# Patient Record
Sex: MALE | Race: WHITE | NOT HISPANIC OR LATINO | Employment: OTHER | URBAN - METROPOLITAN AREA
[De-identification: names, ages, dates, MRNs, and addresses within clinical notes are randomized per-mention and may not be internally consistent; named-entity substitution may affect disease eponyms.]

---

## 2021-02-26 ENCOUNTER — APPOINTMENT (EMERGENCY)
Dept: RADIOLOGY | Facility: HOSPITAL | Age: 66
End: 2021-02-26
Payer: MEDICARE

## 2021-02-26 ENCOUNTER — HOSPITAL ENCOUNTER (EMERGENCY)
Facility: HOSPITAL | Age: 66
Discharge: HOME | End: 2021-02-26
Attending: EMERGENCY MEDICINE | Admitting: EMERGENCY MEDICINE
Payer: MEDICARE

## 2021-02-26 ENCOUNTER — APPOINTMENT (EMERGENCY)
Dept: CT IMAGING | Facility: HOSPITAL | Age: 66
End: 2021-02-26
Payer: MEDICARE

## 2021-02-26 VITALS
DIASTOLIC BLOOD PRESSURE: 98 MMHG | WEIGHT: 205 LBS | OXYGEN SATURATION: 98 % | TEMPERATURE: 97.7 F | RESPIRATION RATE: 20 BRPM | SYSTOLIC BLOOD PRESSURE: 192 MMHG | HEART RATE: 54 BPM

## 2021-02-26 DIAGNOSIS — R07.9 CHEST PAIN: Primary | ICD-10-CM

## 2021-02-26 LAB
ALBUMIN SERPL BCP-MCNC: 4.4 G/DL (ref 3.5–5)
ALP SERPL-CCNC: 64 U/L (ref 46–116)
ALT SERPL W P-5'-P-CCNC: 36 U/L (ref 12–78)
ANION GAP SERPL CALCULATED.3IONS-SCNC: 10 MMOL/L (ref 4–13)
AST SERPL W P-5'-P-CCNC: 20 U/L (ref 5–45)
BASOPHILS # BLD AUTO: 0.05 THOUSANDS/ΜL (ref 0–0.1)
BASOPHILS NFR BLD AUTO: 0 % (ref 0–1)
BILIRUB DIRECT SERPL-MCNC: 0.23 MG/DL (ref 0–0.2)
BILIRUB SERPL-MCNC: 0.7 MG/DL (ref 0.2–1)
BUN SERPL-MCNC: 10 MG/DL (ref 5–25)
CALCIUM SERPL-MCNC: 9.6 MG/DL (ref 8.3–10.1)
CHLORIDE SERPL-SCNC: 103 MMOL/L (ref 100–108)
CO2 SERPL-SCNC: 25 MMOL/L (ref 21–32)
CREAT SERPL-MCNC: 0.86 MG/DL (ref 0.6–1.3)
EOSINOPHIL # BLD AUTO: 0.12 THOUSAND/ΜL (ref 0–0.61)
EOSINOPHIL NFR BLD AUTO: 1 % (ref 0–6)
ERYTHROCYTE [DISTWIDTH] IN BLOOD BY AUTOMATED COUNT: 12.1 % (ref 11.6–15.1)
FLUAV RNA RESP QL NAA+PROBE: NEGATIVE
FLUBV RNA RESP QL NAA+PROBE: NEGATIVE
GFR SERPL CREATININE-BSD FRML MDRD: 92 ML/MIN/1.73SQ M
GLUCOSE SERPL-MCNC: 100 MG/DL (ref 65–140)
HCT VFR BLD AUTO: 47.1 % (ref 36.5–49.3)
HGB BLD-MCNC: 16.2 G/DL (ref 12–17)
IMM GRANULOCYTES # BLD AUTO: 0.05 THOUSAND/UL (ref 0–0.2)
IMM GRANULOCYTES NFR BLD AUTO: 0 % (ref 0–2)
LYMPHOCYTES # BLD AUTO: 1.7 THOUSANDS/ΜL (ref 0.6–4.47)
LYMPHOCYTES NFR BLD AUTO: 12 % (ref 14–44)
MCH RBC QN AUTO: 32.7 PG (ref 26.8–34.3)
MCHC RBC AUTO-ENTMCNC: 34.4 G/DL (ref 31.4–37.4)
MCV RBC AUTO: 95 FL (ref 82–98)
MONOCYTES # BLD AUTO: 0.81 THOUSAND/ΜL (ref 0.17–1.22)
MONOCYTES NFR BLD AUTO: 6 % (ref 4–12)
NEUTROPHILS # BLD AUTO: 11.17 THOUSANDS/ΜL (ref 1.85–7.62)
NEUTS SEG NFR BLD AUTO: 81 % (ref 43–75)
NRBC BLD AUTO-RTO: 0 /100 WBCS
PLATELET # BLD AUTO: 190 THOUSANDS/UL (ref 149–390)
PMV BLD AUTO: 10 FL (ref 8.9–12.7)
POTASSIUM SERPL-SCNC: 4.2 MMOL/L (ref 3.5–5.3)
PROT SERPL-MCNC: 7.9 G/DL (ref 6.4–8.2)
RBC # BLD AUTO: 4.96 MILLION/UL (ref 3.88–5.62)
RSV RNA RESP QL NAA+PROBE: NEGATIVE
SARS-COV-2 RNA RESP QL NAA+PROBE: NEGATIVE
SODIUM SERPL-SCNC: 138 MMOL/L (ref 136–145)
TROPONIN I SERPL-MCNC: <0.02 NG/ML
WBC # BLD AUTO: 13.9 THOUSAND/UL (ref 4.31–10.16)

## 2021-02-26 PROCEDURE — 99285 EMERGENCY DEPT VISIT HI MDM: CPT | Performed by: EMERGENCY MEDICINE

## 2021-02-26 PROCEDURE — 93005 ELECTROCARDIOGRAM TRACING: CPT

## 2021-02-26 PROCEDURE — 74174 CTA ABD&PLVS W/CONTRAST: CPT

## 2021-02-26 PROCEDURE — G1004 CDSM NDSC: HCPCS

## 2021-02-26 PROCEDURE — 0241U HB NFCT DS VIR RESP RNA 4 TRGT: CPT | Performed by: EMERGENCY MEDICINE

## 2021-02-26 PROCEDURE — 36415 COLL VENOUS BLD VENIPUNCTURE: CPT | Performed by: EMERGENCY MEDICINE

## 2021-02-26 PROCEDURE — 99285 EMERGENCY DEPT VISIT HI MDM: CPT

## 2021-02-26 PROCEDURE — 80076 HEPATIC FUNCTION PANEL: CPT | Performed by: EMERGENCY MEDICINE

## 2021-02-26 PROCEDURE — 80048 BASIC METABOLIC PNL TOTAL CA: CPT | Performed by: EMERGENCY MEDICINE

## 2021-02-26 PROCEDURE — 70450 CT HEAD/BRAIN W/O DYE: CPT

## 2021-02-26 PROCEDURE — 71275 CT ANGIOGRAPHY CHEST: CPT

## 2021-02-26 PROCEDURE — 84484 ASSAY OF TROPONIN QUANT: CPT | Performed by: EMERGENCY MEDICINE

## 2021-02-26 PROCEDURE — 71045 X-RAY EXAM CHEST 1 VIEW: CPT

## 2021-02-26 PROCEDURE — 85025 COMPLETE CBC W/AUTO DIFF WBC: CPT | Performed by: EMERGENCY MEDICINE

## 2021-02-26 RX ADMIN — IOHEXOL 100 ML: 350 INJECTION, SOLUTION INTRAVENOUS at 18:34

## 2021-02-26 NOTE — ED PROVIDER NOTES
History  Chief Complaint   Patient presents with    Chest Pain     Pt reports having chest tightness when he was letting his dog out  Felt SOB and then left leg tingling  Pt reports he currently only has tightness in one spot on his vhest       HPI patient is a 14-year-old male, presents with multiple complaints, patient was outside with his dog today and developed left chest tightness  Patient describes this as an uncomfortable soreness, he reports left anterior soreness when he presses on his chest   Patient reports he feels somewhat short of breath  Patient recently has had difficulty with his right knee and now has had a partial right knee replacement  Reports since the knee replacement he has been able to be more active but with activity such as shoveling snow or walking the dog he noticed some increasing shortness of breath  Patient does not developed chest pain during exertion but reports that he becomes winded more easily  Today the episode while he was working with his dog he developed some of this left anterior chest pain, reports also felt like his left leg was somewhat numb or tingling after he sat down in a chair  He denies any loss of consciousness  There are no rapid beating or palpitations  He denies any current chest pain  Denies any current shortness of breath  Patient has a history of bradycardia apparently followed by a cardiologist at 1 point discussed he may require a pacemaker at some time period he reports that 10 years ago he had a thorough evaluation with stress testing and echocardiogram which were normal   Patient presents emergency department today because of his left chest pain, sense of shortness of breath and this tingling feeling in his left leg  He denies any focal weakness in his arms or his legs  He can walk normally  I observed the patient walked to the room    Patient had recent surgery on his upper maxillary teeth, apparently had to have bone graft was on steroids and recently finished antibiotics he denies any fever or chills  Past medical history of bradycardia, carpal tunnel surgery, partial knee replacement surgery   Family history noncontributory   Social history nonsmoker, no history of drug abuse    None       Past Medical History:   Diagnosis Date    Hypertension        Past Surgical History:   Procedure Laterality Date    CARPAL TUNNEL RELEASE      HAND SURGERY      REPLACEMENT TOTAL KNEE         History reviewed  No pertinent family history  I have reviewed and agree with the history as documented  E-Cigarette/Vaping    E-Cigarette Use Never User      E-Cigarette/Vaping Substances     Social History     Tobacco Use    Smoking status: Never Smoker    Smokeless tobacco: Never Used   Substance Use Topics    Alcohol use: Yes     Drinks per session: 1 or 2    Drug use: Never       Review of Systems   Constitutional: Negative for diaphoresis, fatigue and fever  HENT: Negative for congestion, ear pain, nosebleeds and sore throat  Eyes: Negative for photophobia, pain, discharge and visual disturbance  Respiratory: Positive for chest tightness and shortness of breath  Negative for cough, choking and wheezing  Cardiovascular: Positive for chest pain  Negative for palpitations  Gastrointestinal: Negative for abdominal distention, abdominal pain, diarrhea and vomiting  Genitourinary: Negative for dysuria, flank pain and frequency  Musculoskeletal: Negative for back pain, gait problem and joint swelling  Skin: Negative for color change and rash  Neurological: Negative for dizziness, syncope and headaches  Psychiatric/Behavioral: Negative for behavioral problems and confusion  The patient is not nervous/anxious  All other systems reviewed and are negative  Physical Exam  Physical Exam  Vitals signs and nursing note reviewed  Constitutional:       Appearance: He is well-developed  HENT:      Head: Normocephalic        Right Ear: External ear normal       Left Ear: External ear normal       Nose: Nose normal       Mouth/Throat:      Comments: Surgical site, upper maxillary area, no redness no swelling no sign of infection no sign of abscess  Eyes:      General: Lids are normal       Pupils: Pupils are equal, round, and reactive to light  Neck:      Musculoskeletal: Normal range of motion and neck supple  Cardiovascular:      Rate and Rhythm: Normal rate and regular rhythm  Pulses: Normal pulses  Heart sounds: Normal heart sounds  Pulmonary:      Effort: Pulmonary effort is normal  No respiratory distress  Breath sounds: Normal breath sounds  No wheezing  Comments: There is some left chest tenderness  Chest:      Chest wall: Tenderness present  Abdominal:      General: Abdomen is flat  Bowel sounds are normal       Tenderness: There is no abdominal tenderness  Musculoskeletal: Normal range of motion  General: No deformity  Skin:     General: Skin is warm and dry  Neurological:      General: No focal deficit present  Mental Status: He is alert and oriented to person, place, and time       Pulse oximetry normal at 97% adequate oxygenation, there is no hypoxia    Vital Signs  ED Triage Vitals   Temperature Pulse Respirations Blood Pressure SpO2   02/26/21 1410 02/26/21 1410 02/26/21 1410 02/26/21 1410 02/26/21 1410   97 7 °F (36 5 °C) (!) 46 18 (!) 214/86 97 %      Temp src Heart Rate Source Patient Position - Orthostatic VS BP Location FiO2 (%)   -- 02/26/21 1410 02/26/21 1600 02/26/21 1410 --    Monitor Sitting Right arm       Pain Score       --                  Vitals:    02/26/21 1410 02/26/21 1600 02/26/21 1922   BP: (!) 214/86 146/68 (!) 192/98   Pulse: (!) 46 (!) 45 (!) 54   Patient Position - Orthostatic VS:  Sitting Lying         Visual Acuity      ED Medications  Medications   iohexol (OMNIPAQUE) 350 MG/ML injection (MULTI-DOSE) 100 mL (100 mL Intravenous Given 2/26/21 1834) Diagnostic Studies  Results Reviewed     Procedure Component Value Units Date/Time    COVID19, Influenza A/B, RSV PCR, [facility] [[id number]]  (Normal) Collected: 02/26/21 1511    Lab Status: Final result Specimen: Nares from Nasopharyngeal Swab Updated: 02/26/21 1601     SARS-CoV-2 Negative     INFLUENZA A PCR Negative     INFLUENZA B PCR Negative     RSV PCR Negative    Narrative: This test has been authorized by FDA under an EUA (Emergency Use Assay) for use by authorized laboratories  Clinical caution and judgement should be used with the interpretation of these results with consideration of the clinical impression and other laboratory testing  Testing reported as "Positive" or "Negative" has been proven to be accurate according to standard laboratory validation requirements  All testing is performed with control materials showing appropriate reactivity at standard intervals      Troponin I [[id number]]  (Normal) Collected: 02/26/21 1511    Lab Status: Final result Specimen: Blood from Arm, Right Updated: 02/26/21 1536     Troponin I <0 02 ng/mL     Basic metabolic panel [[id number]] Collected: 02/26/21 1511    Lab Status: Final result Specimen: Blood from Arm, Right Updated: 02/26/21 1534     Sodium 138 mmol/L      Potassium 4 2 mmol/L      Chloride 103 mmol/L      CO2 25 mmol/L      ANION GAP 10 mmol/L      BUN 10 mg/dL      Creatinine 0 86 mg/dL      Glucose 100 mg/dL      Calcium 9 6 mg/dL      eGFR 92 ml/min/1 73sq m     Narrative:      [facility] guidelines for Chronic Kidney Disease (CKD):     Stage 1 with normal or high GFR (GFR > 90 mL/min/1 73 square meters)    Stage 2 Mild CKD (GFR = 60-89 mL/min/1 73 square meters)    Stage 3A Moderate CKD (GFR = 45-59 mL/min/1 73 square meters)    Stage 3B Moderate CKD (GFR = 30-44 mL/min/1 73 square meters)    Stage 4 Severe CKD (GFR = 15-29 mL/min/1 73 square meters)    Stage 5 End Stage CKD (GFR <15 mL/min/1 73 square meters)  Note: GFR calculation is accurate only with a steady state creatinine    Hepatic function panel [[id number]]  (Abnormal) Collected: 02/26/21 1511    Lab Status: Final result Specimen: Blood from Arm, Right Updated: 02/26/21 1534     Total Bilirubin 0 70 mg/dL      Bilirubin, Direct 0 23 mg/dL      Alkaline Phosphatase 64 U/L      AST 20 U/L      ALT 36 U/L      Total Protein 7 9 g/dL      Albumin 4 4 g/dL     CBC and differential [[id number]]  (Abnormal) Collected: 02/26/21 1511    Lab Status: Final result Specimen: Blood from Arm, Right Updated: 02/26/21 1518     WBC 13 90 Thousand/uL      RBC 4 96 Million/uL      Hemoglobin 16 2 g/dL      Hematocrit 47 1 %      MCV 95 fL      MCH 32 7 pg      MCHC 34 4 g/dL      RDW 12 1 %      MPV 10 0 fL      Platelets 249 Thousands/uL      nRBC 0 /100 WBCs      Neutrophils Relative 81 %      Immat GRANS % 0 %      Lymphocytes Relative 12 %      Monocytes Relative 6 %      Eosinophils Relative 1 %      Basophils Relative 0 %      Neutrophils Absolute 11 17 Thousands/µL      Immature Grans Absolute 0 05 Thousand/uL      Lymphocytes Absolute 1 70 Thousands/µL      Monocytes Absolute 0 81 Thousand/µL      Eosinophils Absolute 0 12 Thousand/µL      Basophils Absolute 0 05 Thousands/µL                  CTA dissection protocol chest/abdomen/pelvis   Final Result by Malika Mullins DO (02/26 1923)      No evidence of acute aortic pathology  No acute abnormality in the chest abdomen or pelvis  Workstation performed: [id number]         XR chest 1 view portable   Final Result by Malika Mullins DO (02/26 1528)      No acute cardiopulmonary disease  Workstation performed: [id number]         CT head without contrast   Final Result by Vaishali Edmond DO (02/26 1605)      No acute intracranial abnormality                    Workstation performed: [id number]                    Procedures  ECG 12 Lead Documentation Only    Date/Time: 2/26/2021 9:18 PM  Performed by: Lady Gabrielle MD  Authorized by: Lady Gabrielle MD     Indications / Diagnosis:    Chest pain  ECG reviewed by me, the ED Provider: yes    Patient location:  ED  Previous ECG:     Previous ECG:  Unavailable  Interpretation:     Interpretation: non-specific    Rate:     ECG rate:   47    ECG rate assessment: bradycardic    Rhythm:     Rhythm: sinus bradycardia    Comments:       Sinus bradycardia, incomplete right bundle,  Left anterior fascicular block,no acute ST-T wave changes             ED Course             HEART Risk Score      Most Recent Value   Heart Score Risk Calculator   History  0 Filed at: 02/26/2021 1629   ECG  0 Filed at: 02/26/2021 1629   Age  1 Filed at: 02/26/2021 1629   Risk Factors  0 Filed at: 02/26/2021 1629   Troponin  0 Filed at: 02/26/2021 1629   HEART Score  1 Filed at: 02/26/2021 1629           because of the chest discomfort, shortness of breaths and the patient's numbness in his left leg there was a broad differential   Discussed with patient concerned about cardiac ischemia, concerned about aortic dissection  Concerned about CVA  Diagnostic testing:     COVID testing was required patients with COVID unfortunately developed cardiomyopathies which could be the cause of this patient's chest pain  Cardiac troponin was negative no sign of cardiac ischemia patient's pain occurred early in the morning approximately 9:10 a m  it was over 3 hours before the arrival the patient in the emergency department  Electrolytes were within normal limits no sign of renal dysfunction  liver functions were normal  Patient did have an elevated white count at 13 9, patient had recent steroids and also surgery on his maxillary area, no sign of abscess at this time     CT of the brain was performed because the patient had some left leg numbness and concern for possible CVA, there was no intracranial pathology seen patient has no current deficit      CT scan of the chest was performed because the patient had chest pain with associated numbness in his leg I was concerned about aortic dissection with tracking down the patient's descending aorta causing neurological impairment, CT was negative  Chest x-ray showed no acute pathology  Discussed with patient and wife at length patient has a resting bradycardia this appears to be chronically will follow-up with her cardiologist about this there is no sign current ischemia or acute cardiac or neurological event  MDM medical decision making 77-year-old male presents emergency department with left chest pain, he does have some palpable tenderness there and is pain may be musculoskeletal   Patient also reports shortness of breath with some exertion, also reports some left leg tingling  CT the brain showed no acute pathology no CVA, cardiac workup showed no cardiac ischemia  Patient's heart rate stayed in the upper 40s to low 50s  Discussed with patient his wife at length he is always been bradycardic and was advised at some point he might need a pacemaker  This could explain his shortness of breath with exertion  Also patient recently had a right knee partial replacement which now he is very active with, he may perhaps be decondition   I discussed with patient no indication for admission at this time, I believe safe to discharge  Low heart score We discussed outpatient treatment and follow-up we discussed indications to return  Disposition  Final diagnoses:   Chest pain     Time reflects when diagnosis was documented in both MDM as applicable and the Disposition within this note     Time User Action Codes Description Comment    2/26/2021  4:29 PM Tegan Davalos Add [R07 9] Chest pain       ED Disposition     ED Disposition Condition Date/Time Comment    Discharge Stable Fri Feb 26, 2021  7:40 PM Jacqlyn Channel discharge to home/self care              Follow-up Information    None         There are no discharge medications for this patient  No discharge procedures on file      PDMP Review     None          ED Provider  Electronically Signed by           José Wheeler MD  02/27/21 8924

## 2021-02-26 NOTE — DISCHARGE INSTRUCTIONS
Heat to the area  Rest  Follow-up with your cardiologist as planned  Return worsening pain , any progression of symptoms or any problems

## 2021-02-28 LAB
ATRIAL RATE: 47 BPM
P AXIS: 64 DEGREES
PR INTERVAL: 154 MS
QRS AXIS: -56 DEGREES
QRSD INTERVAL: 100 MS
QT INTERVAL: 442 MS
QTC INTERVAL: 391 MS
T WAVE AXIS: 26 DEGREES
VENTRICULAR RATE: 47 BPM

## 2021-02-28 PROCEDURE — 93010 ELECTROCARDIOGRAM REPORT: CPT | Performed by: INTERNAL MEDICINE

## 2023-06-28 ENCOUNTER — EVALUATION (OUTPATIENT)
Dept: PHYSICAL THERAPY | Facility: CLINIC | Age: 68
End: 2023-06-28
Payer: MEDICARE

## 2023-06-28 DIAGNOSIS — M48.062 PSEUDOCLAUDICATION SYNDROME: ICD-10-CM

## 2023-06-28 DIAGNOSIS — M47.816 SPONDYLOSIS WITHOUT MYELOPATHY OR RADICULOPATHY, LUMBAR REGION: Primary | ICD-10-CM

## 2023-06-28 PROCEDURE — 97110 THERAPEUTIC EXERCISES: CPT | Performed by: PHYSICAL THERAPIST

## 2023-06-28 PROCEDURE — 97161 PT EVAL LOW COMPLEX 20 MIN: CPT | Performed by: PHYSICAL THERAPIST

## 2023-06-28 NOTE — PROGRESS NOTES
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                      Assessment  Assessment details: Emelina Cosby is a 74 y o  male presenting as an outpatient to [place] [address] PT w/ c/o lumbosacral pain  In addition to pain, pt presents w/ impaired posture, s/s of SIJ dysfunction, decreased ROM, decreased strength, and reports of impaired balance significantly limiting pt's functional ability  Pt will benefit from skilled PT services to address the above deficits in order to max function to allow pt to achieve goals in PT  Thank you for the referral of this pt  Impairments: abnormal or restricted ROM, activity intolerance, impaired physical strength and pain with function  Understanding of Dx/Px/POC: good   Prognosis: good    Goals  ST  Pain decreased by 25% in 4-6 weeks  2  ROM increased by 25% in 4-6 weeks  3  Strength increased by 1/2 to 1 muscle grade in all deficient muscle groups in 4-6 weeks  LT  Decrease pain to 1-2/10 at worst by d/c   2  Increase ROM to [facility] for all deficient movements by d/c   3  Strength increased to 5 for all deficient muscle groups by d/c   4  IADL performance increased to max function by d/c   5  Recreational performance increased to max function by d/c   6  Ambulation/stair climbing improved to max level of function by d/c      Plan  Planned modality interventions: cryotherapy, TENS and thermotherapy: hydrocollator packs  Other planned modality interventions: other modalities PRN  Planned therapy interventions: abdominal trunk stabilization, ADL retraining, IADL retraining, balance, flexibility, functional ROM exercises, graded exercise, home exercise program, manual therapy, joint mobilization, neuromuscular re-education, patient education, postural training, strengthening, therapeutic exercise, therapeutic activities, transfer training and work reintegration  Other planned therapy interventions: other interventions PRN  Frequency: 1-2x/week  Duration in weeks: 4  Plan of Care beginning date: 2023  Plan of Care expiration date: 2023  Treatment plan discussed with: patient        Subjective Evaluation    History of Present Illness  Mechanism of injury: Pt reports to IE w/ c/o lumbosacral pain which began approximately 2 years ago possibly due to compensation for R knee w/ progressive worsening  Pt w/ hx of L4-L5 facet injections approx 1 5 years ago which lasted him approx 3 weeks  He had another set of injections, but pain returned  Pt had rhizotomy for R side done approx 1 year ago which helped LE symptoms significantly   Pt had L sided rhizotomy 4-5 weeks later  Pt had MLD procedure done on L4-L5 in early April  Pt reports that he was restricted to < 1 hour driving initially  He ended up sitting in car for 4 hours due to poor weather a couple weeks post surgery and felt that his pain returned significantly, but more into sacral area  Pt underwent another MILD procedure done on  at L5-S1 - had initial significant improvement in symptoms, but pain and parasthesias returned since then  Pt reports intermittent parasthesias into anterior thighs  He adds that he feels that his legs may give out at times  He does not use AD  Otherwise, pain typically localized to lumbosacral area at this time  Pain  Current pain ratin  At worst pain ratin  Location: Lumbosacral area  Alleviating factors: lidocaine patches, Tramadol, Baclofen  Exacerbated by: walking > 10-15 minutes, intermittently sleeping at night, stair climbing (compensates w/ STS fashion), lifting, functional squat      Social Support  Stairs in house: yes (Only in [address] [place] [address])   Patient lives at: 1 story home in the [place] [address]; 2 story home in [facility] (in the process of moving from [facility] [facility] to [place] [address])  Lives with: spouse    Employment status: not working (retired- pt was an  for years)  Patient Goals  Patient goals for therapy: increased strength and decreased pain          Objective     Active Range of Motion     Lumbar   Flexion: Active lumbar flexion: *lumbosacral pain  Restriction level: minimal  Extension: Active lumbar extension: *lumbosacral pain  Restriction level: minimal  Left lateral flexion:  WFL  Right lateral flexion:  WFL  Left rotation:  WFL  Right rotation:  [facility]    Strength/Myotome Testing     Left Hip   Planes of Motion   Flexion: 4+ (*pain)    Right Hip   Planes of Motion   Flexion: 4 (*pain)    Left Knee   Flexion: 5  Extension: 5    Right Knee   Flexion: 5  Extension: 5    Left Ankle/Foot   Dorsiflexion: 5  Plantar flexion: 5  Great toe extension: 5    Right Ankle/Foot   Dorsiflexion: 5  Plantar flexion: 5  Great toe extension: 5    Tests     Additional Tests Details  Observation/Posture: Pt sits w/ PPT and fwd, rounded shoulders and fwd head    Palpation: Hypertonicity/tenderness w/ palpation to     Special Tests (L/R):   SLR: positive/positive  Crossed SLR: negative/negative    SIJ:   Distraction: negative  Supine --> sit assessment/LLD: positive for right innominate posterior rotation    Supine Marches: Fair core strength            Daily Treatment Diary    EPOC: 7/26/23  Precautions: HTN- takes meds; Hx of R partial TKA  Co- Morbidities: HTN- takes meds;  Hx of R partial TKA    Manuals 6/28            Consider TPR to lumbosacral area                          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            Total Time             There Ex    Pt ed 12' HEP instruct/create handout            Recumbent bike                          LTR             Hip ER stretch             Piriformis Stretch             Pball lumbar flexion stretch                          Neuro-Re-ed    DLS: isometric abdominals- TA             DLS: isometric hip abduction w/ TA contraction             DLS: isometric hip adduction w/ TA contraction             DLS: fall outs w/ TA contraction             DLS: marches w/ TA contraction             DLS: S/l hip abduction             DLS: clamshells                          Ther Activity    STS             Step ups             Gait Training                              Modalities    CP PRN Home if needed

## 2023-06-28 NOTE — LETTER
2023    Lexie Call MD  [address] [id number]    Patient: Marlee Post   YOB: 1955   Date of Visit: 2023     Encounter Diagnosis     ICD-10-CM    1  Spondylosis without myelopathy or radiculopathy, lumbar region  M47 816       2  Pseudoclaudication syndrome  I35 [id number]           Dear Dr Bingham Gu:    Thank you for your recent referral of Alvarez Holliday  Please review the attached evaluation summary from Alvarez's recent visit  Please verify that you agree with the plan of care by signing the attached order  If you have any questions or concerns, please do not hesitate to call  I sincerely appreciate the opportunity to share in the care of one of your patients and hope to have another opportunity to work with you in the near future  Sincerely,    Darren Doll, PT      Referring Provider:      I certify that I have read the below Plan of Care and certify the need for these services furnished under this plan of treatment while under my care  Lexie Call MD  [address] [address]  Via Fax: [phone]          PT Evaluation     Today's date: 2023  Patient name: Marlee Post  : 1955  MRN: [id number]  Referring provider: No ref  provider found  Dx:   Encounter Diagnosis     ICD-10-CM    1  Spondylosis without myelopathy or radiculopathy, lumbar region  M47 816       2  Pseudoclaudication syndrome  M48 062                      Assessment  Assessment details: Marlee Post is a 74 y o  male presenting as an outpatient to [place] [address] PT w/ c/o lumbosacral pain  In addition to pain, pt presents w/ impaired posture, s/s of SIJ dysfunction, decreased ROM, decreased strength, and reports of impaired balance significantly limiting pt's functional ability  Pt will benefit from skilled PT services to address the above deficits in order to max function to allow pt to achieve goals in PT  Thank you for the referral of this pt  Impairments: abnormal or restricted ROM, activity intolerance, impaired physical strength and pain with function  Understanding of Dx/Px/POC: good   Prognosis: good    Goals  ST  Pain decreased by 25% in 4-6 weeks  2  ROM increased by 25% in 4-6 weeks  3  Strength increased by 1/2 to 1 muscle grade in all deficient muscle groups in 4-6 weeks  LT  Decrease pain to 1-2/10 at worst by d/c   2  Increase ROM to [facility] for all deficient movements by d/c   3  Strength increased to 5 for all deficient muscle groups by d/c   4  IADL performance increased to max function by d/c   5  Recreational performance increased to max function by d/c   6  Ambulation/stair climbing improved to max level of function by d/c  Plan  Planned modality interventions: cryotherapy, TENS and thermotherapy: hydrocollator packs  Other planned modality interventions: other modalities PRN  Planned therapy interventions: abdominal trunk stabilization, ADL retraining, IADL retraining, balance, flexibility, functional ROM exercises, graded exercise, home exercise program, manual therapy, joint mobilization, neuromuscular re-education, patient education, postural training, strengthening, therapeutic exercise, therapeutic activities, transfer training and work reintegration  Other planned therapy interventions: other interventions PRN  Frequency: 1-2x/week  Duration in weeks: 4  Plan of Care beginning date: 2023  Plan of Care expiration date: 2023  Treatment plan discussed with: patient        Subjective Evaluation    History of Present Illness  Mechanism of injury: Pt reports to IE w/ c/o lumbosacral pain which began approximately 2 years ago possibly due to compensation for R knee w/ progressive worsening  Pt w/ hx of L4-L5 facet injections approx 1 5 years ago which lasted him approx 3 weeks  He had another set of injections, but pain returned   Pt had rhizotomy for R side done approx 1 year ago which helped EUSEBIO symptoms significantly   Pt had L sided rhizotomy 4-5 weeks later  Pt had MLD procedure done on L4-L5 in early April  Pt reports that he was restricted to < 1 hour driving initially  He ended up sitting in car for 4 hours due to poor weather a couple weeks post surgery and felt that his pain returned significantly, but more into sacral area  Pt underwent another MILD procedure done on  at L5-S1 - had initial significant improvement in symptoms, but pain and parasthesias returned since then  Pt reports intermittent parasthesias into anterior thighs  He adds that he feels that his legs may give out at times  He does not use AD  Otherwise, pain typically localized to lumbosacral area at this time  Pain  Current pain ratin  At worst pain ratin  Location: Lumbosacral area  Alleviating factors: lidocaine patches, Tramadol, Baclofen  Exacerbated by: walking > 10-15 minutes, intermittently sleeping at night, stair climbing (compensates w/ STS fashion), lifting, functional squat  Social Support  Stairs in house: yes (Only in [address] [place] [address])   Patient lives at: 1 story home in the [place]; 2 story home in [facility] (in the process of moving from [facility] to [place])  Lives with: spouse    Employment status: not working (retired- pt was an  for years)  Patient Goals  Patient goals for therapy: increased strength and decreased pain          Objective     Active Range of Motion     Lumbar   Flexion: Active lumbar flexion: *lumbosacral pain  Restriction level: minimal  Extension: Active lumbar extension: *lumbosacral pain    Restriction level: minimal  Left lateral flexion:  WFL  Right lateral flexion:  WFL  Left rotation:  WFL  Right rotation:  [facility]    Strength/Myotome Testing     Left Hip   Planes of Motion   Flexion: 4+ (*pain)    Right Hip   Planes of Motion   Flexion: 4 (*pain)    Left Knee   Flexion: 5  Extension: 5    Right Knee   Flexion: 5  Extension: 5    Left Ankle/Foot Dorsiflexion: 5  Plantar flexion: 5  Great toe extension: 5    Right Ankle/Foot   Dorsiflexion: 5  Plantar flexion: 5  Great toe extension: 5    Tests     Additional Tests Details  Observation/Posture: Pt sits w/ PPT and fwd, rounded shoulders and fwd head    Palpation: Hypertonicity/tenderness w/ palpation to     Special Tests (L/R):   SLR: positive/positive  Crossed SLR: negative/negative    SIJ:   Distraction: negative  Supine --> sit assessment/LLD: positive for right innominate posterior rotation    Supine Marches: Fair core strength           Daily Treatment Diary    EPOC: 7/26/23  Precautions: HTN- takes meds; Hx of R partial TKA  Co- Morbidities: HTN- takes meds;  Hx of R partial TKA    Manuals 6/28            Consider TPR to lumbosacral area                          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            Total Time             There Ex    Pt ed 12' HEP instruct/create handout            Recumbent bike                          LTR             Hip ER stretch             Piriformis Stretch             Pball lumbar flexion stretch                          Neuro-Re-ed    DLS: isometric abdominals- TA             DLS: isometric hip abduction w/ TA contraction             DLS: isometric hip adduction w/ TA contraction             DLS: fall outs w/ TA contraction             DLS: marches w/ TA contraction             DLS: S/l hip abduction             DLS: clamshells                          Ther Activity    STS             Step ups             Gait Training                              Modalities    CP PRN Home if needed

## 2023-07-03 ENCOUNTER — OFFICE VISIT (OUTPATIENT)
Dept: PHYSICAL THERAPY | Facility: CLINIC | Age: 68
End: 2023-07-03
Payer: MEDICARE

## 2023-07-03 DIAGNOSIS — M47.816 SPONDYLOSIS WITHOUT MYELOPATHY OR RADICULOPATHY, LUMBAR REGION: Primary | ICD-10-CM

## 2023-07-03 PROCEDURE — 97140 MANUAL THERAPY 1/> REGIONS: CPT

## 2023-07-03 PROCEDURE — 97110 THERAPEUTIC EXERCISES: CPT

## 2023-07-03 NOTE — PROGRESS NOTES
Daily Note     Today's date: 7/3/2023  Patient name: Lor Martin  : 1955  MRN: [id number]  Referring provider: Iraida Botello MD  Dx:   Encounter Diagnosis     ICD-10-CM    1. Spondylosis without myelopathy or radiculopathy, lumbar region  M47.816                      Subjective: Pt reports waking up very stiff and spastic today in LB. Objective: See treatment diary below      Assessment: Tolerated treatment well. Patient would benefit from continued PT. Reviewed HEP stretches and corrected on some minor positional issues. Educated pt to no over due it w/ his exercises and do everything within tolerance. Reviewed reasons for LB to spasm and be painful and that it can get worse before it gets better. Pt did have improved posture and decreased pain post session. Plan: Continue per plan of care. Daily Treatment Diary    EPOC: 23  Precautions: HTN- takes meds; Hx of R partial TKA  Co- Morbidities: HTN- takes meds;  Hx of R partial TKA    Manuals 6/28 7/3           Consider TPR to lumbosacral area                          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 JH -R inn post rot           Total Time             There Ex    Pt ed 12' HEP instruct/create handout Reviewed            Recumbent bike                          LTR  5" 10x           Hip ER stretch  15" 3x ea           Piriformis Stretch  15" 3x ea           Pball lumbar flexion stretch                          Neuro-Re-ed    DLS: isometric abdominals- TA             DLS: isometric hip abduction w/ TA contraction             DLS: isometric hip adduction w/ TA contraction             DLS: fall outs w/ TA contraction             DLS: marches w/ TA contraction             DLS: S/l hip abduction             DLS: clamshells                          Ther Activity    STS             Step ups             Gait Training                              Modalities    CP PRN Home if needed

## 2023-07-05 ENCOUNTER — OFFICE VISIT (OUTPATIENT)
Dept: PHYSICAL THERAPY | Facility: CLINIC | Age: 68
End: 2023-07-05
Payer: MEDICARE

## 2023-07-05 DIAGNOSIS — M47.816 SPONDYLOSIS WITHOUT MYELOPATHY OR RADICULOPATHY, LUMBAR REGION: Primary | ICD-10-CM

## 2023-07-05 DIAGNOSIS — M48.062 PSEUDOCLAUDICATION SYNDROME: ICD-10-CM

## 2023-07-05 PROCEDURE — 97110 THERAPEUTIC EXERCISES: CPT | Performed by: PHYSICAL THERAPIST

## 2023-07-05 PROCEDURE — 97140 MANUAL THERAPY 1/> REGIONS: CPT | Performed by: PHYSICAL THERAPIST

## 2023-07-05 NOTE — PROGRESS NOTES
Daily Note     Today's date: 2023  Patient name: Octavia Pan  : 1955  MRN: [id number]  Referring provider: Mino Martinez MD  Dx:   Encounter Diagnosis     ICD-10-CM    1. Spondylosis without myelopathy or radiculopathy, lumbar region  M47.816       2. Pseudoclaudication syndrome  M48.062                      Subjective:  Pt reports that he held off on exercise yesterday and just did "heat all day" as he felt he had overdone at several days prior. He does report feeling better immediately following LV. He states beng able to walk more erect post tx sessions. Objective: See treatment diary below. Pt 12' late for tx session- accommodated pt. Assessment: Tx session tolerated well as below- reminded pt to stay w/in pain-free ROM for ex. Pt concerned that he is still having pain around area of surgery- reminded pt that he is only approx 1 month out from surgery and is still healing. Pt verbalizes understanding. Overall, pt reports improved pain level post tx session. Plan: Continue per plan of care. Daily Treatment Diary    EPOC: 23  Precautions: HTN- takes meds; Hx of R partial TKA  Co- Morbidities: HTN- takes meds;  Hx of R partial TKA    Manuals 6/28 7/3 7/5          Consider TPR to lumbosacral area   RB -seated                       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 JH -R inn post rot RB          Total Time             There Ex    Pt ed 12' HEP instruct/create handout Reviewed  RB- educaton on expectatons, healing process/recovery time/HEP w/in pain-free ROM          Recumbent bike                          LTR  5" 10x 5"x10x ea           Hip ER stretch  15" 3x ea 15"x3 ea           Piriformis Stretch  15" 3x ea 15"x3 ea          Pball lumbar flexion stretch   10"x10                       Neuro-Re-ed    DLS: isometric abdominals- TA   10"x10          DLS: isometric hip abduction w/ TA contraction   HEP          DLS: isometric hip adduction w/ TA contraction   HEP DLS: fall outs w/ TA contraction             DLS: marches w/ TA contraction             DLS: S/l hip abduction             [facility]                          Ther Activity    STS             Step ups             Gait Training                              Modalities    CP PRN Home if needed  10' h/l

## 2023-07-10 ENCOUNTER — OFFICE VISIT (OUTPATIENT)
Dept: PHYSICAL THERAPY | Facility: CLINIC | Age: 68
End: 2023-07-10
Payer: MEDICARE

## 2023-07-10 DIAGNOSIS — M48.062 PSEUDOCLAUDICATION SYNDROME: ICD-10-CM

## 2023-07-10 DIAGNOSIS — M47.816 SPONDYLOSIS WITHOUT MYELOPATHY OR RADICULOPATHY, LUMBAR REGION: Primary | ICD-10-CM

## 2023-07-10 PROCEDURE — 97110 THERAPEUTIC EXERCISES: CPT | Performed by: PHYSICAL THERAPIST

## 2023-07-10 PROCEDURE — 97140 MANUAL THERAPY 1/> REGIONS: CPT | Performed by: PHYSICAL THERAPIST

## 2023-07-10 NOTE — PROGRESS NOTES
Daily Note     Today's date: 7/10/2023  Patient name: Jim Piña  : 1955  MRN: [id number]  Referring provider: Sadie Cruz MD  Dx:   Encounter Diagnosis     ICD-10-CM    1. Spondylosis without myelopathy or radiculopathy, lumbar region  M47.816       2. Pseudoclaudication syndrome  M48.062                       Subjective:  Pt reports doing very well for several days following tx session up until Saturday night/ when he felt his back tighten up again. He reports doing no HEP on  and did mostly sitting/lying down w/ heat. Additionally, he reports that the past 2  have been very painful to the point that he has trouble getting out of bed. Objective: See treatment diary below. Assessment: Tx session tolerated fairly well today. Pt reports overall improvement in symptoms post ex + manuals and CP. Advised pt to avoid completely avoiding HEP when more painful, but instead to perform a couple of gentle stretches and use CP as needed. Also suggested pt split up HEP to perform some in the AM and some later. Plan: Continue per plan of care. Daily Treatment Diary    EPOC: 23  Precautions: HTN- takes meds; Hx of R partial TKA  Co- Morbidities: HTN- takes meds;  Hx of R partial TKA    Manuals 6/28 7/3 7/5 7/10         Consider TPR to lumbosacral area   RB -seated RB- seated                      SI MET f/b [employer] tech RB -R inn post rot JH -R inn post rot RB RB- R inn post rot         Total Time             There Ex    Pt ed 12' HEP instruct/create handout Reviewed  RB- educaton on expectatons, healing process/recovery time/HEP w/in pain-free ROM RB- education on expectations/healing process/HEP         Recumbent bike                          LTR  5" 10x 5"x10x ea  10"x10 ea b/l          Hip ER stretch  15" 3x ea 15"x3 ea  15"x3 ea b/l         Piriformis Stretch  15" 3x ea 15"x3 ea 15"x3 ea         Pball lumbar flexion stretch   10"x10                       Neuro-Re-ed DLS: isometric abdominals- TA   10"x10 10"x10         DLS: isometric hip abduction w/ TA contraction   HEP          DLS: isometric hip adduction w/ TA contraction   HEP          DLS: fall outs w/ TA contraction             DLS: marches w/ TA contraction             DLS: S/l hip abduction             DLS: clamshells                          Ther Activity    STS             Step ups             Gait Training                              Modalities    CP PRN Home if needed  10' h/l 10' h/l

## 2023-07-12 ENCOUNTER — OFFICE VISIT (OUTPATIENT)
Dept: PHYSICAL THERAPY | Facility: CLINIC | Age: 68
End: 2023-07-12
Payer: MEDICARE

## 2023-07-12 DIAGNOSIS — M47.816 SPONDYLOSIS WITHOUT MYELOPATHY OR RADICULOPATHY, LUMBAR REGION: Primary | ICD-10-CM

## 2023-07-12 DIAGNOSIS — M48.062 PSEUDOCLAUDICATION SYNDROME: ICD-10-CM

## 2023-07-12 PROCEDURE — 97140 MANUAL THERAPY 1/> REGIONS: CPT | Performed by: PHYSICAL THERAPIST

## 2023-07-12 PROCEDURE — 97112 NEUROMUSCULAR REEDUCATION: CPT | Performed by: PHYSICAL THERAPIST

## 2023-07-12 NOTE — PROGRESS NOTES
Daily Note     Today's date: 2023  Patient name: Milly Altamirano  : 1955  MRN: [id number]  Referring provider: Bijan Gonsalez MD  Dx:   Encounter Diagnosis     ICD-10-CM    1. Spondylosis without myelopathy or radiculopathy, lumbar region  M47.816       2. Pseudoclaudication syndrome  M48.062                       Subjective:  Pt reports that he was sore/painful again yesterday but pushed through some stretching/walking and felt better today. Objective: See treatment diary below. Pt 28' late for tx session - confused appt times. Accommodated pt. Assessment: Added more functional strength training this visit. Pt tolerated well w/o any increased pain reported. Plan: Continue per plan of care. Daily Treatment Diary    EPOC: 23  Precautions: HTN- takes meds; Hx of R partial TKA  Co- Morbidities: HTN- takes meds;  Hx of R partial TKA    Manuals 6/28 7/3 7/5 7/10 7/12        Consider TPR to lumbosacral area  JH RB -seated RB- seated RB- seated                     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 JH -R inn post rot RB RB- R inn post rot         Total Time             There Ex    Pt ed 12' HEP instruct/create handout Reviewed  RB- educaton on expectatons, healing process/recovery time/HEP w/in pain-free ROM RB- education on expectations/healing process/HEP         Recumbent bike                          LTR  5" 10x 5"x10x ea  10"x10 ea b/l          Hip ER stretch  15" 3x ea 15"x3 ea  15"x3 ea b/l         Piriformis Stretch  15" 3x ea 15"x3 ea 15"x3 ea         Pball lumbar flexion stretch   10"x10  10"x10                     Neuro-Re-ed    DLS: isometric abdominals- TA   10"x10 10"x10 10"x10 seated        DLS: isometric hip abduction w/ TA contraction   HEP          DLS: isometric hip adduction w/ TA contraction   HEP          DLS: fall outs w/ TA contraction             DLS: marches w/ TA contraction             DLS: S/l hip abduction             DLS: clamshells             Hip hinge- seated     1-2"x10        Ther Activity    STS     10x        Step ups             Gait Training                              Modalities    CP PRN Home if needed  10' h/l 10' h/l  10' h/l

## 2023-07-17 ENCOUNTER — OFFICE VISIT (OUTPATIENT)
Dept: PHYSICAL THERAPY | Facility: CLINIC | Age: 68
End: 2023-07-17
Payer: MEDICARE

## 2023-07-17 DIAGNOSIS — M47.816 SPONDYLOSIS WITHOUT MYELOPATHY OR RADICULOPATHY, LUMBAR REGION: Primary | ICD-10-CM

## 2023-07-17 DIAGNOSIS — M48.062 PSEUDOCLAUDICATION SYNDROME: ICD-10-CM

## 2023-07-17 PROCEDURE — 97140 MANUAL THERAPY 1/> REGIONS: CPT | Performed by: PHYSICAL THERAPIST

## 2023-07-17 PROCEDURE — 97112 NEUROMUSCULAR REEDUCATION: CPT | Performed by: PHYSICAL THERAPIST

## 2023-07-17 PROCEDURE — 97110 THERAPEUTIC EXERCISES: CPT | Performed by: PHYSICAL THERAPIST

## 2023-07-17 NOTE — PROGRESS NOTES
Daily Note     Today's date: 2023  Patient name: Jim Piña  : 1955  MRN: [id number]  Referring provider: Sadie Cruz MD  Dx:   Encounter Diagnosis     ICD-10-CM    1. Spondylosis without myelopathy or radiculopathy, lumbar region  M47.816       2. Pseudoclaudication syndrome  M48.062                       Subjective:  Pt reports that he was painful today as he has been the past couple of ; however he states that it may be slightly less painful than it has been. He has also been using CP and his hot tub. Objective: See treatment diary below. Assessment: Tx session tolerated well as below. Pt reports feeling better post tx session. Plan: Continue per plan of care. Daily Treatment Diary    EPOC: 23  Precautions: HTN- takes meds; Hx of R partial TKA  Co- Morbidities: HTN- takes meds;  Hx of R partial TKA    Manuals 6/28 7/3 7/5 7/10 7/12 7/17       Consider TPR to lumbosacral area  JH RB -seated RB- seated RB- seated RB-seated                    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 JH -R inn post rot RB RB- R inn post rot         Total Time             There Ex    Pt ed 12' HEP instruct/create handout Reviewed  RB- educaton on expectatons, healing process/recovery time/HEP w/in pain-free ROM RB- education on expectations/healing process/HEP         Recumbent bike      4' w/ couple of breaks                    LTR  5" 10x 5"x10x ea  10"x10 ea b/l          Hip ER stretch  15" 3x ea 15"x3 ea  15"x3 ea b/l         Piriformis Stretch  15" 3x ea 15"x3 ea 15"x3 ea         Pball lumbar flexion stretch   10"x10  10"x10 10"x10 fwd/10"x 5 L/R                    Neuro-Re-ed    DLS: isometric abdominals- TA   10"x10 10"x10 10"x10 seated 10"x10 seated       DLS: isometric hip abduction w/ TA contraction   HEP          DLS: isometric hip adduction w/ TA contraction   HEP          DLS: fall outs w/ TA contraction             DLS: marches w/ TA contraction             DLS: S/l hip abduction             DLS: clamshells             Hip hinge- seated     1-2"x10 1-2"x 10       Ther Activity    STS     10x 10x       Step ups             Gait Training                              Modalities    CP PRN Home if needed  10' h/l 10' h/l  10' h/l 10' h/l

## 2023-07-19 ENCOUNTER — OFFICE VISIT (OUTPATIENT)
Dept: PHYSICAL THERAPY | Facility: CLINIC | Age: 68
End: 2023-07-19
Payer: MEDICARE

## 2023-07-19 DIAGNOSIS — M48.062 PSEUDOCLAUDICATION SYNDROME: ICD-10-CM

## 2023-07-19 DIAGNOSIS — M47.816 SPONDYLOSIS WITHOUT MYELOPATHY OR RADICULOPATHY, LUMBAR REGION: Primary | ICD-10-CM

## 2023-07-19 PROCEDURE — 97140 MANUAL THERAPY 1/> REGIONS: CPT | Performed by: PHYSICAL THERAPIST

## 2023-07-19 PROCEDURE — 97110 THERAPEUTIC EXERCISES: CPT | Performed by: PHYSICAL THERAPIST

## 2023-07-19 PROCEDURE — 97112 NEUROMUSCULAR REEDUCATION: CPT | Performed by: PHYSICAL THERAPIST

## 2023-07-19 NOTE — PROGRESS NOTES
Daily Note     Today's date: 2023  Patient name: Ryan Hinojosa  : 1955  MRN: [id number]  Referring provider: Sapphire Dailey MD  Dx:   Encounter Diagnosis     ICD-10-CM    1. Spondylosis without myelopathy or radiculopathy, lumbar region  M47.816       2. Pseudoclaudication syndrome  M48.062                       Subjective:  Pt states that he should not have done the bike a second time LV as he was pretty painful afterwards. Objective: See treatment diary below. Assessment: Tx session tolerated well this visit as below. Advised pt to start doing marches/fall outs at home. Plan: Continue per plan of care. Daily Treatment Diary    EPOC: 23  Precautions: HTN- takes meds; Hx of R partial TKA  Co- Morbidities: HTN- takes meds;  Hx of R partial TKA    Manuals 6/28 7/3 7/5 7/10 7/12 7/17 7/19      Consider TPR to lumbosacral area  JH RB -seated RB- seated RB- seated RB-seated RB- seated                   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 JH -R inn post rot RB RB- R inn post rot   RB- R inn post rot      Total Time             There Ex    Pt ed 12' HEP instruct/create handout Reviewed  RB- educaton on expectatons, healing process/recovery time/HEP w/in pain-free ROM RB- education on expectations/healing process/HEP         Recumbent bike      4' w/ couple of breaks                    LTR  5" 10x 5"x10x ea  10"x10 ea b/l          Hip ER stretch  15" 3x ea 15"x3 ea  15"x3 ea b/l         Piriformis Stretch  15" 3x ea 15"x3 ea 15"x3 ea         Pball lumbar flexion stretch   10"x10  10"x10 10"x10 fwd/10"x 5 L/R 10"x10 fwd/10"x5 L/R                   Neuro-Re-ed    DLS: isometric abdominals- TA   10"x10 10"x10 10"x10 seated 10"x10 seated       DLS: isometric hip abduction w/ TA contraction   HEP          DLS: isometric hip adduction w/ TA contraction   HEP          DLS: fall outs w/ TA contraction       15x ea b/l       DLS: marches w/ TA contraction       15x ea b/l       DLS: S/l hip abduction             DLS: clamshells             Hip hinge- seated     1-2"x10 1-2"x 10 1-2"x 10      Ther Activity    STS     10x 10x 10x      Step ups             Gait Training                              Modalities    CP PRN Home if needed  10' h/l 10' h/l  10' h/l 10' h/l 10' h/l

## 2023-07-24 ENCOUNTER — OFFICE VISIT (OUTPATIENT)
Dept: PHYSICAL THERAPY | Facility: CLINIC | Age: 68
End: 2023-07-24
Payer: MEDICARE

## 2023-07-24 DIAGNOSIS — M47.816 SPONDYLOSIS WITHOUT MYELOPATHY OR RADICULOPATHY, LUMBAR REGION: Primary | ICD-10-CM

## 2023-07-24 DIAGNOSIS — M48.062 PSEUDOCLAUDICATION SYNDROME: ICD-10-CM

## 2023-07-24 PROCEDURE — 97110 THERAPEUTIC EXERCISES: CPT

## 2023-07-24 PROCEDURE — 97140 MANUAL THERAPY 1/> REGIONS: CPT

## 2023-07-24 NOTE — PROGRESS NOTES
Daily Note     Today's date: 2023  Patient name: Willow Mohan  : 1955  MRN: [id number]  Referring provider: Jose Marin MD  Dx:   Encounter Diagnosis     ICD-10-CM    1. Spondylosis without myelopathy or radiculopathy, lumbar region  M47.816       2. Pseudoclaudication syndrome  M48.062           Start Time: 1500          Subjective: pt reports continued pain in the back. Reports compliance w/ HEP. Objective: See treatment diary below      Assessment: Tolerated treatment well. Patient would benefit from continued PT. Decreased stiffness post TPR. Continued w/ current exercises as listed below w/ good tolerance. Plan: Continue per plan of care. Daily Treatment Diary    EPOC: 23  Precautions: HTN- takes meds; Hx of R partial TKA  Co- Morbidities: HTN- takes meds;  Hx of R partial TKA    Manuals 6/28 7/3 7/5 7/10 7/12 7/17 7/19 7/24     Consider TPR to lumbosacral area  JH RB -seated RB- seated RB- seated RB-seated RB- seated JH-seated                  SI MET f/b [employer] tech RB -R inn post rot JH -R inn post rot RB RB- R inn post rot   RB- R inn post rot JH- R inn post rot     Total Time             There Ex    Pt ed 12' HEP instruct/create handout Reviewed  RB- educaton on expectatons, healing process/recovery time/HEP w/in pain-free ROM RB- education on expectations/healing process/HEP         Recumbent bike      4' w/ couple of breaks                    LTR  5" 10x 5"x10x ea  10"x10 ea b/l          Hip ER stretch  15" 3x ea 15"x3 ea  15"x3 ea b/l         Piriformis Stretch  15" 3x ea 15"x3 ea 15"x3 ea         Pball lumbar flexion stretch   10"x10  10"x10 10"x10 fwd/10"x 5 L/R 10"x10 fwd/10"x5 L/R 10"x10 fwd/10"x5 L/R                  Neuro-Re-ed    DLS: isometric abdominals- TA   10"x10 10"x10 10"x10 seated 10"x10 seated  10"x10 seated     DLS: isometric hip abduction w/ TA contraction   HEP          DLS: isometric hip adduction w/ TA contraction   HEP          DLS: fall outs w/ TA contraction       15x ea b/l  15x ea b/l      DLS: marches w/ TA contraction       15x ea b/l  15x ea b/l      DLS: S/l hip abduction             DLS: kelsie             Hip hinge- seated     1-2"x10 1-2"x 10 1-2"x 10      Ther Activity    STS     10x 10x 10x      Step ups             Gait Training                              Modalities    CP PRN Home if needed  10' h/l 10' h/l  10' h/l 10' h/l 10' h/l  10' h/l

## 2023-07-26 ENCOUNTER — APPOINTMENT (OUTPATIENT)
Dept: PHYSICAL THERAPY | Facility: CLINIC | Age: 68
End: 2023-07-26
Payer: MEDICARE

## 2023-07-28 ENCOUNTER — APPOINTMENT (OUTPATIENT)
Dept: PHYSICAL THERAPY | Facility: CLINIC | Age: 68
End: 2023-07-28
Payer: MEDICARE

## 2023-07-31 ENCOUNTER — EVALUATION (OUTPATIENT)
Dept: PHYSICAL THERAPY | Facility: CLINIC | Age: 68
End: 2023-07-31
Payer: MEDICARE

## 2023-07-31 DIAGNOSIS — M47.816 SPONDYLOSIS WITHOUT MYELOPATHY OR RADICULOPATHY, LUMBAR REGION: Primary | ICD-10-CM

## 2023-07-31 DIAGNOSIS — M48.062 PSEUDOCLAUDICATION SYNDROME: ICD-10-CM

## 2023-07-31 PROCEDURE — 97110 THERAPEUTIC EXERCISES: CPT | Performed by: PHYSICAL THERAPIST

## 2023-07-31 PROCEDURE — 97140 MANUAL THERAPY 1/> REGIONS: CPT | Performed by: PHYSICAL THERAPIST

## 2023-07-31 PROCEDURE — 97014 ELECTRIC STIMULATION THERAPY: CPT | Performed by: PHYSICAL THERAPIST

## 2023-07-31 NOTE — PROGRESS NOTES
PT Re-evaluation     Today's date: 2023  Patient name: Jmi Piña  : 1955  MRN: [id number]  Referring provider: Sadie Cruz MD  Dx:   Encounter Diagnosis     ICD-10-CM    1. Spondylosis without myelopathy or radiculopathy, lumbar region  M47.816       2. Pseudoclaudication syndrome  M48.062                      Assessment  Assessment details: Jim Piña is a 74 y.o. male presenting as an outpatient to [facility]. [facility] PT w/ c/o lumbosacral pain post surgery. Pt has not been present for the past week due to needing to driving down to [place] for 2 funerals during this time. Due to this, pt more painful today. Despite increased pain today, since IE, pt has made gains in ROM, strength, and overall activity tolerance/function. However, he continues to remain limited from max function. Although increased pain today, pt states that he had been feeling less overall pain prior to last week (driving 2 hours to funerals, excessive standing at funerals) and usually feels better leaving tx sessions. Pt will continue to benefit from skilled PT services to address continued deficits in order to max function to allow pt to achieve goals in PT. Thank you. Impairments: abnormal or restricted ROM, activity intolerance, impaired physical strength and pain with function  Understanding of Dx/Px/POC: good   Prognosis: good    Goals  ST. Pain decreased by 25% in 4-6 weeks. -PROGRESSING (with the exception of today)  2. ROM increased by 25% in 4-6 weeks. - PARTIALLY MET  3. Strength increased by 1/2 to 1 muscle grade in all deficient muscle groups in 4-6 weeks. - PARTIALLY MET    LT. Decrease pain to 1-2/10 at worst by d/c. - PROGRESSING  2. Increase ROM to [facility] for all deficient movements by d/c. PROGRESSING  3. Strength increased to 5 for all deficient muscle groups by d/c. PROGRESSING  4. IADL performance increased to max function by d/c. PROGRESSING  5.  Recreational performance increased to max function by d/c. PROGRESSING  6. Ambulation/stair climbing improved to max level of function by d/c. PROGRESSING    Plan  Planned modality interventions: cryotherapy, TENS and thermotherapy: hydrocollator packs  Other planned modality interventions: other modalities PRN  Planned therapy interventions: abdominal trunk stabilization, ADL retraining, IADL retraining, balance, flexibility, functional ROM exercises, graded exercise, home exercise program, manual therapy, joint mobilization, neuromuscular re-education, patient education, postural training, strengthening, therapeutic exercise, therapeutic activities, transfer training and work reintegration  Other planned therapy interventions: other interventions PRN  Frequency: 2-3x/week. Duration in weeks: 4  Plan of Care beginning date: 7/31/23  Plan of Care expiration date: 8/28/23  Treatment plan discussed with: patient        Subjective Evaluation    History of Present Illness  CURRENT LEVEL (7/31/23)  With the exception of this past week, pt reports that pain levels had slowly been improving w/ skilled PT tx. However, Mondays continued to be the most painful for pt as it was 5 days since his LV. Pt was able to tolerate driving down to [place] (approx 2 hours) w/ rest breaks and intermittently needing to lie down in back of truck while wife drove. He reports doing well immediately after the drive down and attributes some of that to having skilled PT tx in the AM prior to driving down. He has been responding well to manuals in conjunction w/ exercise. However, he reports developing a significant amount of pain after standing all day at 1 of the 2 funerals he needed to attend which lasted into today. Pt admits that he had limited time for HEP during this time as well. Pt does not have stairs in [facility], but was able to negotiate stairs in reciprocating fashion at home in \[facility]\"" this past week. Prior to this week, pt reports that he has been compliant w/ HEP. INITIAL LEVEL (6/28/23)  Mechanism of injury: Pt reports to IE w/ c/o lumbosacral pain which began approximately 2 years ago possibly due to compensation for R knee w/ progressive worsening. Pt w/ hx of L4-L5 facet injections approx 1.5 years ago which lasted him approx 3 weeks. He had another set of injections, but pain returned. Pt had rhizotomy for R side done approx 1 year ago which helped LE symptoms significantly . Pt had L sided rhizotomy 4-5 weeks later. Pt had MLD procedure done on L4-L5 in early April. Pt reports that he was restricted to < 1 hour driving initially. He ended up sitting in car for 4 hours due to poor weather a couple weeks post surgery and felt that his pain returned significantly, but more into sacral area. Pt underwent another MILD procedure done on 6/5 at L5-S1 - had initial significant improvement in symptoms, but pain and parasthesias returned since then. Pt reports intermittent parasthesias into anterior thighs. He adds that he feels that his legs may give out at times. He does not use AD. Otherwise, pain typically localized to lumbosacral area at this time. Pain  Current pain rating: 10/10 this week, but 6/10 usually at worst prior to driving to/from [facility] for funerals this past week  Best pain level: 3/10  At worst pain rating: 10  Location: Lumbosacral area  Alleviating factors: lidocaine patches, Tramadol  Exacerbated by: walking > 10-15 minutes, intermittently sleeping at night, stair climbing (compensates w/ reciprocating  fashion), lifting, functional squat.     Social Support  Stairs in house: yes (Only in [address] [place] [address])   Patient lives at: 1 story home in the [facility]; 2 story home in [facility] (in the process of moving from [facility] to [facility])  Lives with: spouse    Employment status: not working (retired- pt was an  for years)  Patient Goals  Patient goals for therapy: increased strength and decreased pain- Progressing      Objective Active Range of Motion     Lumbar   Flexion: Active lumbar flexion: *lumbosacral soreness. Restriction level: minimal  Extension: Active lumbar extension: *lumbosacral pain. Restriction level: WFL  Left lateral flexion:  WFL  Right lateral flexion:  WFL  Left rotation:  WFL  Right rotation:  [facility]    Strength/Myotome Testing     Left Hip   Planes of Motion   Flexion: 4+ (*pain)    Right Hip   Planes of Motion   Flexion: 4+ (*pain)    Left Knee   Flexion: 5  Extension: 5    Right Knee   Flexion: 5  Extension: 5    Left Ankle/Foot   Dorsiflexion: 5  Plantar flexion: 5  Great toe extension: 5    Right Ankle/Foot   Dorsiflexion: 5  Plantar flexion: 5  Great toe extension: 5    DNT knee, ankle/foot strength on 7/31 due to time constraints as this was 5/5 at IE    Tests     Additional Tests Details  Observation/Posture: Pt sits w/ PPT and fwd, rounded shoulders and fwd head    Palpation: Hypertonicity/tenderness w/ palpation to b/l l/s and lower t/s psp, b/l QL, b/l TFL    Special Tests (L/R):   SLR: positive/negative  Crossed SLR: negative/negative    SIJ:   Distraction: negative  Supine --> sit assessment/LLD: positive for right innominate posterior rotation    Supine Marches: Fair core strength    3' unbilled due to pt using bathroom    Daily Treatment Diary    EPOC: 8/28/23  Precautions: HTN- takes meds; Hx of R partial TKA  Co- Morbidities: HTN- takes meds;  Hx of R partial TKA    Manuals 6/28 7/3 7/5 7/10 7/12 7/17 7/19 7/24 7/31    Consider TPR to lumbosacral area   RB -seated RB- seated RB- seated RB-seated RB- seated JH-seated RB- seated                 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 JH -R inn post rot RB RB- R inn post rot   RB- R inn post rot JH- R inn post rot RB- R inn post rot    Total Time             There Ex    Pt ed 12' HEP instruct/create handout Reviewed  RB- educaton on expectatons, healing process/recovery time/HEP w/in pain-free ROM RB- education on expectations/healing process/HEP         Progress Note         RB    Recumbent bike      4' w/ couple of breaks       Hip flexor stretch         NV standing    LTR  5" 10x 5"x10x ea  10"x10 ea b/l          Hip ER stretch  15" 3x ea 15"x3 ea  15"x3 ea b/l         Piriformis Stretch  15" 3x ea 15"x3 ea 15"x3 ea         Pball lumbar flexion stretch   10"x10  10"x10 10"x10 fwd/10"x 5 L/R 10"x10 fwd/10"x5 L/R 10"x10 fwd/10"x5 L/R 10"x10 fwd/10"x5 L/R                 Neuro-Re-ed    DLS: isometric abdominals- TA   10"x10 10"x10 10"x10 seated 10"x10 seated  10"x10 seated     DLS: isometric hip abduction w/ TA contraction   HEP          DLS: isometric hip adduction w/ TA contraction   HEP          DLS: fall outs w/ TA contraction       15x ea b/l  15x ea b/l      DLS: marches w/ TA contraction       15x ea b/l  15x ea b/l      DLS: S/l hip abduction             DLS: clamshells             Hip hinge- seated     1-2"x10 1-2"x 10 1-2"x 10      Ther Activity    STS     10x 10x 10x      Step ups             Gait Training                              Modalities    CP PRN Home if needed  10' h/l 10' h/l  10' h/l 10' h/l 10' h/l  10' h/l  10' h/l     TENS         10' h/l

## 2023-07-31 NOTE — LETTER
2023    Sammi Valverde MD  [id number] [address] [address]    Patient: Kei Owens   YOB: 1955   Date of Visit: 2023     Encounter Diagnosis     ICD-10-CM    1. Spondylosis without myelopathy or radiculopathy, lumbar region  M47.816       2. Pseudoclaudication syndrome  O75.262           Dear Dr. Nyasia Martinez:    Thank you for your recent referral of Alvarez Holliday. Please review the attached evaluation summary from Alvarez's recent visit. Please verify that you agree with the plan of care by signing the attached order. If you have any questions or concerns, please do not hesitate to call. I sincerely appreciate the opportunity to share in the care of one of your patients and hope to have another opportunity to work with you in the near future. Sincerely,    Darren Doll, PT      Referring Provider:      I certify that I have read the below Plan of Care and certify the need for these services furnished under this plan of treatment while under my care. Sammi Valverde MD  [address] [address]   Via Fax: [phone]          PT Re-evaluation     Today's date: 2023  Patient name: Kei Owens  : 1955  MRN: [id number]  Referring provider: No ref. provider found  Dx:   Encounter Diagnosis     ICD-10-CM    1. Spondylosis without myelopathy or radiculopathy, lumbar region  M47.816       2. Pseudoclaudication syndrome  M48.062                      Assessment  Assessment details: Kei Owens is a 74 y.o. male presenting as an outpatient to [address] [place] [address]. [facility] PT w/ c/o lumbosacral pain post surgery. Pt has not been present for the past week due to needing to driving down to [place] for 2 funerals during this time. Due to this, pt more painful today. Despite increased pain today, since IE, pt has made gains in ROM, strength, and overall activity tolerance/function. However, he continues to remain limited from max function.   Although increased pain today, pt states that he had been feeling less overall pain prior to last week (driving 2 hours to funerals, excessive standing at funerals) and usually feels better leaving tx sessions. Pt will continue to benefit from skilled PT services to address continued deficits in order to max function to allow pt to achieve goals in PT. Thank you. Impairments: abnormal or restricted ROM, activity intolerance, impaired physical strength and pain with function  Understanding of Dx/Px/POC: good   Prognosis: good    Goals  ST. Pain decreased by 25% in 4-6 weeks. -PROGRESSING (with the exception of today)  2. ROM increased by 25% in 4-6 weeks. - PARTIALLY MET  3. Strength increased by 1/2 to 1 muscle grade in all deficient muscle groups in 4-6 weeks. - PARTIALLY MET    LT. Decrease pain to 1-2/10 at worst by d/c. - PROGRESSING  2. Increase ROM to [facility] for all deficient movements by d/c. PROGRESSING  3. Strength increased to 5 for all deficient muscle groups by d/c. PROGRESSING  4. IADL performance increased to max function by d/c. PROGRESSING  5. Recreational performance increased to max function by d/c. PROGRESSING  6. Ambulation/stair climbing improved to max level of function by d/c. PROGRESSING    Plan  Planned modality interventions: cryotherapy, TENS and thermotherapy: hydrocollator packs  Other planned modality interventions: other modalities PRN  Planned therapy interventions: abdominal trunk stabilization, ADL retraining, IADL retraining, balance, flexibility, functional ROM exercises, graded exercise, home exercise program, manual therapy, joint mobilization, neuromuscular re-education, patient education, postural training, strengthening, therapeutic exercise, therapeutic activities, transfer training and work reintegration  Other planned therapy interventions: other interventions PRN  Frequency: 1-2x/week.   Duration in weeks: 4  Plan of Care beginning date: 23  Plan of Care expiration date: 8/28/23  Treatment plan discussed with: patient        Subjective Evaluation    History of Present Illness  CURRENT LEVEL (7/31/23)  With the exception of this past week, pt reports that pain levels had slowly been improving w/ skilled PT tx. However, Mondays continued to be the most painful for pt as it was 5 days since his LV. Pt was able to tolerate driving down to [place] (approx 2 hours) w/ rest breaks and intermittently needing to lie down in back of truck while wife drove. He reports doing well immediately after the drive down and attributes some of that to having skilled PT tx in the AM prior to driving down. He has been responding well to manuals in conjunction w/ exercise. However, he reports developing a significant amount of pain after standing all day at 1 of the 2 funerals he needed to attend which lasted into today. Pt admits that he had limited time for HEP during this time as well. Pt does not have stairs in [place] home, but was able to negotiate stairs in reciprocating fashion at home in [facility] this past week. Prior to this week, pt reports that he has been compliant w/ HEP. INITIAL LEVEL (6/28/23)  Mechanism of injury: Pt reports to IE w/ c/o lumbosacral pain which began approximately 2 years ago possibly due to compensation for R knee w/ progressive worsening. Pt w/ hx of L4-L5 facet injections approx 1.5 years ago which lasted him approx 3 weeks. He had another set of injections, but pain returned. Pt had rhizotomy for R side done approx 1 year ago which helped LE symptoms significantly . Pt had L sided rhizotomy 4-5 weeks later. Pt had MLD procedure done on L4-L5 in early April. Pt reports that he was restricted to < 1 hour driving initially. He ended up sitting in car for 4 hours due to poor weather a couple weeks post surgery and felt that his pain returned significantly, but more into sacral area.     Pt underwent another MILD procedure done on 6/5 at L5-S1 - had initial significant improvement in symptoms, but pain and parasthesias returned since then. Pt reports intermittent parasthesias into anterior thighs. He adds that he feels that his legs may give out at times. He does not use AD. Otherwise, pain typically localized to lumbosacral area at this time. Pain  Current pain rating: 10/10 this week, but 6/10 usually at worst prior to driving to/from [facility] for funerals this past week  Best pain level: 3/10  At worst pain rating: 10  Location: Lumbosacral area  Alleviating factors: lidocaine patches, Tramadol  Exacerbated by: walking > 10-15 minutes, intermittently sleeping at night, stair climbing (compensates w/ reciprocating  fashion), lifting, functional squat. Social Support  Stairs in house: yes (Only in [address] [place] [address])   Patient lives at: 1 story home in the [facility]; 2 story home in [facility] (in the process of moving from [facility] to [facility])  Lives with: spouse    Employment status: not working (retired- pt was an  for years)  Patient Goals  Patient goals for therapy: increased strength and decreased pain- Progressing      Objective     Active Range of Motion     Lumbar   Flexion: Active lumbar flexion: *lumbosacral soreness. Restriction level: minimal  Extension: Active lumbar extension: *lumbosacral pain.   Restriction level: WFL  Left lateral flexion:  WFL  Right lateral flexion:  WFL  Left rotation:  WFL  Right rotation:  [facility]    Strength/Myotome Testing     Left Hip   Planes of Motion   Flexion: 4+ (*pain)    Right Hip   Planes of Motion   Flexion: 4+ (*pain)    Left Knee   Flexion: 5  Extension: 5    Right Knee   Flexion: 5  Extension: 5    Left Ankle/Foot   Dorsiflexion: 5  Plantar flexion: 5  Great toe extension: 5    Right Ankle/Foot   Dorsiflexion: 5  Plantar flexion: 5  Great toe extension: 5    DNT knee, ankle/foot strength on 7/31 due to time constraints as this was 5/5 at     Tests     Additional Tests Details  Observation/Posture: Pt sits w/ PPT and fwd, rounded shoulders and fwd head    Palpation: Hypertonicity/tenderness w/ palpation to b/l l/s and lower t/s psp, b/l QL, b/l TFL    Special Tests (L/R):   SLR: positive/negative  Crossed SLR: negative/negative    SIJ:   Distraction: negative  Supine --> sit assessment/LLD: positive for right innominate posterior rotation    Supine Marches: Fair core strength    3' unbilled due to pt using bathroom    Daily Treatment Diary    EPOC: 8/28/23  Precautions: HTN- takes meds; Hx of R partial TKA  Co- Morbidities: HTN- takes meds;  Hx of R partial TKA    Manuals 6/28 7/3 7/5 7/10 7/12 7/17 7/19 7/24 7/31    Consider TPR to lumbosacral area   RB -seated RB- seated RB- seated RB-seated RB- seated JH-seated RB- seated                 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 JH -R inn post rot RB RB- R inn post rot   RB- R inn post rot JH- R inn post rot RB- R inn post rot    Total Time             There Ex    Pt ed 12' HEP instruct/create handout Reviewed  RB- educaton on expectatons, healing process/recovery time/HEP w/in pain-free ROM RB- education on expectations/healing process/HEP         Progress Note         RB    Recumbent bike      4' w/ couple of breaks       Hip flexor stretch         NV standing    LTR  5" 10x 5"x10x ea  10"x10 ea b/l          Hip ER stretch  15" 3x ea 15"x3 ea  15"x3 ea b/l         Piriformis Stretch  15" 3x ea 15"x3 ea 15"x3 ea         Pball lumbar flexion stretch   10"x10  10"x10 10"x10 fwd/10"x 5 L/R 10"x10 fwd/10"x5 L/R 10"x10 fwd/10"x5 L/R 10"x10 fwd/10"x5 L/R                 Neuro-Re-ed    DLS: isometric abdominals- TA   10"x10 10"x10 10"x10 seated 10"x10 seated  10"x10 seated     DLS: isometric hip abduction w/ TA contraction   HEP          DLS: isometric hip adduction w/ TA contraction   HEP          DLS: fall outs w/ TA contraction       15x ea b/l  15x ea b/l      DLS: marches w/ TA contraction       15x ea b/l  15x ea b/l DLS: S/l hip abduction             DLS: [place]ola             Hip hinge- seated     1-2"x10 1-2"x 10 1-2"x 10      Ther Activity    STS     10x 10x 10x      Step ups             Gait Training                              Modalities    CP PRN Home if needed  10' h/l 10' h/l  10' h/l 10' h/l 10' h/l  10' h/l  10' h/l     TENS         10' h/l

## 2023-07-31 NOTE — LETTER
2023    Michael Mustafa MD  [facility] [facility] [address]    Patient: Manda Mac   YOB: 1955   Date of Visit: 2023     Encounter Diagnosis     ICD-10-CM    1. Spondylosis without myelopathy or radiculopathy, lumbar region  M47.816       2. Pseudoclaudication syndrome  Z76.144           Dear Dr. Blaze Tovar:    Thank you for your recent referral of Alvarez Holliday. Please review the attached evaluation summary from Alvarez's recent visit. Please verify that you agree with the plan of care by signing the attached order. If you have any questions or concerns, please do not hesitate to call. I sincerely appreciate the opportunity to share in the care of one of your patients and hope to have another opportunity to work with you in the near future. Sincerely,    Darren Doll, PT      Referring Provider:      I certify that I have read the below Plan of Care and certify the need for these services furnished under this plan of treatment while under my care. Michael Mustafa MD  [address] [address] [address]  Via Fax: [phone]          PT Re-evaluation     Today's date: 2023  Patient name: Manda Mac  : 1955  MRN: [id number]  Referring provider: Michael Mustafa MD  Dx:   Encounter Diagnosis     ICD-10-CM    1. Spondylosis without myelopathy or radiculopathy, lumbar region  M47.816       2. Pseudoclaudication syndrome  M48.062                      Assessment  Assessment details: Manda Mac is a 74 y.o. male presenting as an outpatient to [facility]. Jorge's PT w/ c/o lumbosacral pain post surgery. Pt has not been present for the past week due to needing to driving down to [place] for 2 funerals during this time. Due to this, pt more painful today. Despite increased pain today, since IE, pt has made gains in ROM, strength, and overall activity tolerance/function. However, he continues to remain limited from max function.   Although increased pain today, pt states that he had been feeling less overall pain prior to last week (driving 2 hours to funerals, excessive standing at funerals) and usually feels better leaving tx sessions. Pt will continue to benefit from skilled PT services to address continued deficits in order to max function to allow pt to achieve goals in PT. Thank you. Impairments: abnormal or restricted ROM, activity intolerance, impaired physical strength and pain with function  Understanding of Dx/Px/POC: good   Prognosis: good    Goals  ST. Pain decreased by 25% in 4-6 weeks. -PROGRESSING (with the exception of today)  2. ROM increased by 25% in 4-6 weeks. - PARTIALLY MET  3. Strength increased by 1/2 to 1 muscle grade in all deficient muscle groups in 4-6 weeks. - PARTIALLY MET    LT. Decrease pain to 1-2/10 at worst by d/c. - PROGRESSING  2. Increase ROM to [facility] for all deficient movements by d/c. PROGRESSING  3. Strength increased to 5 for all deficient muscle groups by d/c. PROGRESSING  4. IADL performance increased to max function by d/c. PROGRESSING  5. Recreational performance increased to max function by d/c. PROGRESSING  6. Ambulation/stair climbing improved to max level of function by d/c. PROGRESSING    Plan  Planned modality interventions: cryotherapy, TENS and thermotherapy: hydrocollator packs  Other planned modality interventions: other modalities PRN  Planned therapy interventions: abdominal trunk stabilization, ADL retraining, IADL retraining, balance, flexibility, functional ROM exercises, graded exercise, home exercise program, manual therapy, joint mobilization, neuromuscular re-education, patient education, postural training, strengthening, therapeutic exercise, therapeutic activities, transfer training and work reintegration  Other planned therapy interventions: other interventions PRN  Frequency: 2-3x/week.   Duration in weeks: 4  Plan of Care beginning date: 23  Plan of Care expiration date: 8/28/23  Treatment plan discussed with: patient        Subjective Evaluation    History of Present Illness  CURRENT LEVEL (7/31/23)  With the exception of this past week, pt reports that pain levels had slowly been improving w/ skilled PT tx. However, Mondays continued to be the most painful for pt as it was 5 days since his LV. Pt was able to tolerate driving down to [place] (approx 2 hours) w/ rest breaks and intermittently needing to lie down in back of truck while wife drove. He reports doing well immediately after the drive down and attributes some of that to having skilled PT tx in the AM prior to driving down. He has been responding well to manuals in conjunction w/ exercise. However, he reports developing a significant amount of pain after standing all day at 1 of the 2 funerals he needed to attend which lasted into today. Pt admits that he had limited time for HEP during this time as well. Pt does not have stairs in [place] home, but was able to negotiate stairs in reciprocating fashion at home in [facility] this past week. Prior to this week, pt reports that he has been compliant w/ HEP. INITIAL LEVEL (6/28/23)  Mechanism of injury: Pt reports to IE w/ c/o lumbosacral pain which began approximately 2 years ago possibly due to compensation for R knee w/ progressive worsening. Pt w/ hx of L4-L5 facet injections approx 1.5 years ago which lasted him approx 3 weeks. He had another set of injections, but pain returned. Pt had rhizotomy for R side done approx 1 year ago which helped LE symptoms significantly . Pt had L sided rhizotomy 4-5 weeks later. Pt had MLD procedure done on L4-L5 in early April. Pt reports that he was restricted to < 1 hour driving initially. He ended up sitting in car for 4 hours due to poor weather a couple weeks post surgery and felt that his pain returned significantly, but more into sacral area.     Pt underwent another MILD procedure done on 6/5 at L5-S1 - had initial significant improvement in symptoms, but pain and parasthesias returned since then. Pt reports intermittent parasthesias into anterior thighs. He adds that he feels that his legs may give out at times. He does not use AD. Otherwise, pain typically localized to lumbosacral area at this time. Pain  Current pain rating: 10/10 this week, but 6/10 usually at worst prior to driving to/from [facility] for funerals this past week  Best pain level: 3/10  At worst pain rating: 10  Location: Lumbosacral area  Alleviating factors: lidocaine patches, Tramadol  Exacerbated by: walking > 10-15 minutes, intermittently sleeping at night, stair climbing (compensates w/ reciprocating  fashion), lifting, functional squat. Social Support  Stairs in house: yes (Only in [address] [place] [address])   Patient lives at: 1 story home in the [place]; 2 story home in [facility] (in the process of moving from [facility] to [place])  Lives with: spouse    Employment status: not working (retired- pt was an  for years)  Patient Goals  Patient goals for therapy: increased strength and decreased pain- Progressing      Objective     Active Range of Motion     Lumbar   Flexion: Active lumbar flexion: *lumbosacral soreness. Restriction level: minimal  Extension: Active lumbar extension: *lumbosacral pain.   Restriction level: WFL  Left lateral flexion:  WFL  Right lateral flexion:  WFL  Left rotation:  WFL  Right rotation:  [facility]    Strength/Myotome Testing     Left Hip   Planes of Motion   Flexion: 4+ (*pain)    Right Hip   Planes of Motion   Flexion: 4+ (*pain)    Left Knee   Flexion: 5  Extension: 5    Right Knee   Flexion: 5  Extension: 5    Left Ankle/Foot   Dorsiflexion: 5  Plantar flexion: 5  Great toe extension: 5    Right Ankle/Foot   Dorsiflexion: 5  Plantar flexion: 5  Great toe extension: 5    DNT knee, ankle/foot strength on 7/31 due to time constraints as this was 5/5 at     Tests     Additional Tests Details  Observation/Posture: Pt sits w/ PPT and fwd, rounded shoulders and fwd head    Palpation: Hypertonicity/tenderness w/ palpation to b/l l/s and lower t/s psp, b/l QL, b/l TFL    Special Tests (L/R):   SLR: positive/negative  Crossed SLR: negative/negative    SIJ:   Distraction: negative  Supine --> sit assessment/LLD: positive for right innominate posterior rotation    Supine Marches: Fair core strength    3' unbilled due to pt using bathroom    Daily Treatment Diary    EPOC: 8/28/23  Precautions: HTN- takes meds; Hx of R partial TKA  Co- Morbidities: HTN- takes meds;  Hx of R partial TKA    Manuals 6/28 7/3 7/5 7/10 7/12 7/17 7/19 7/24 7/31    Consider TPR to lumbosacral area   RB -seated RB- seated RB- seated RB-seated RB- seated JH-seated RB- seated                 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 JH -R inn post rot RB RB- R inn post rot   RB- R inn post rot JH- R inn post rot RB- R inn post rot    Total Time             There Ex    Pt ed 12' HEP instruct/create handout Reviewed  RB- educaton on expectatons, healing process/recovery time/HEP w/in pain-free ROM RB- education on expectations/healing process/HEP         Progress Note         RB    Recumbent bike      4' w/ couple of breaks       Hip flexor stretch         NV standing    LTR  5" 10x 5"x10x ea  10"x10 ea b/l          Hip ER stretch  15" 3x ea 15"x3 ea  15"x3 ea b/l         Piriformis Stretch  15" 3x ea 15"x3 ea 15"x3 ea         Pball lumbar flexion stretch   10"x10  10"x10 10"x10 fwd/10"x 5 L/R 10"x10 fwd/10"x5 L/R 10"x10 fwd/10"x5 L/R 10"x10 fwd/10"x5 L/R                 Neuro-Re-ed    DLS: isometric abdominals- TA   10"x10 10"x10 10"x10 seated 10"x10 seated  10"x10 seated     DLS: isometric hip abduction w/ TA contraction   HEP          DLS: isometric hip adduction w/ TA contraction   HEP          DLS: fall outs w/ TA contraction       15x ea b/l  15x ea b/l      DLS: marches w/ TA contraction       15x ea b/l  15x ea b/l DLS: S/l hip abduction             DLS: clanicola             Hip hinge- seated     1-2"x10 1-2"x 10 1-2"x 10      Ther Activity    STS     10x 10x 10x      Step ups             Gait Training                              Modalities    CP PRN Home if needed  10' h/l 10' h/l  10' h/l 10' h/l 10' h/l  10' h/l  10' h/l     TENS         10' h/l

## 2023-08-02 ENCOUNTER — OFFICE VISIT (OUTPATIENT)
Dept: PHYSICAL THERAPY | Facility: CLINIC | Age: 68
End: 2023-08-02
Payer: MEDICARE

## 2023-08-02 DIAGNOSIS — M47.816 SPONDYLOSIS WITHOUT MYELOPATHY OR RADICULOPATHY, LUMBAR REGION: Primary | ICD-10-CM

## 2023-08-02 PROCEDURE — 97140 MANUAL THERAPY 1/> REGIONS: CPT

## 2023-08-02 PROCEDURE — 97110 THERAPEUTIC EXERCISES: CPT

## 2023-08-02 NOTE — PROGRESS NOTES
Daily Note     Today's date: 2023  Patient name: Sayra Mckeon  : 1955  MRN: [id number]  Referring provider: Noni Pate MD  Dx:   Encounter Diagnosis     ICD-10-CM    1. Spondylosis without myelopathy or radiculopathy, lumbar region  M47.816                      Subjective: pt reports he feels good post previous session but started to have pain the next day. Objective: See treatment diary below      Assessment: Tolerated treatment well. Patient would benefit from continued PT. Pt able to re introduce some strengthening w/ good tolerance. Plan: Continue per plan of care. Daily Treatment Diary    EPOC: 23  Precautions: HTN- takes meds; Hx of R partial TKA  Co- Morbidities: HTN- takes meds;  Hx of R partial TKA    Manuals 6/28 7/3 7/5 7/10 7/12 7/17 7/19 7/24 7/31 8/2   Consider TPR to lumbosacral area   RB -seated RB- seated RB- seated RB-seated RB- seated JH-seated RB- seated JH-seated                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 JH -R inn post rot RB RB- R inn post rot   RB- R inn post rot JH- R inn post rot RB- R inn post rot    Total Time             There Ex    Pt ed 12' HEP instruct/create handout Reviewed  RB- educaton on expectatons, healing process/recovery time/HEP w/in pain-free ROM RB- education on expectations/healing process/HEP         Progress Note         RB    Recumbent bike      4' w/ couple of breaks       Hip flexor stretch         NV standing    LTR  5" 10x 5"x10x ea  10"x10 ea b/l       10" 10x ea b/l   Hip ER stretch  15" 3x ea 15"x3 ea  15"x3 ea b/l      15"x3 ea b/l   Piriformis Stretch  15" 3x ea 15"x3 ea 15"x3 ea      15"x3 ea b/l   Pball lumbar flexion stretch   10"x10  10"x10 10"x10 fwd/10"x 5 L/R 10"x10 fwd/10"x5 L/R 10"x10 fwd/10"x5 L/R 10"x10 fwd/10"x5 L/R 10"x10 fwd/10"x5 L/R                Neuro-Re-ed    DLS: isometric abdominals- TA   10"x10 10"x10 10"x10 seated 10"x10 seated  10"x10 seated  T/o session   DLS: isometric hip abduction w/ TA contraction   HEP          DLS: isometric hip adduction w/ TA contraction   HEP          DLS: fall outs w/ TA contraction       15x ea b/l  15x ea b/l      DLS: marches w/ TA contraction       15x ea b/l  15x ea b/l   15x ea   DLS: S/l hip abduction             DLS: clamshells             Hip hinge- seated     1-2"x10 1-2"x 10 1-2"x 10      Ther Activity    STS     10x 10x 10x   10x   Step ups             Gait Training                              Modalities    CP PRN Home if needed  10' h/l 10' h/l  10' h/l 10' h/l 10' h/l  10' h/l  10' h/l  10' h/l    TENS         10' h/l

## 2023-08-03 NOTE — PROGRESS NOTES
Daily Note     Today's date: 2023  Patient name: Sayra Mckeon  : 1955  MRN: [id number]  Referring provider: Noni Pate MD  Dx:   Encounter Diagnosis     ICD-10-CM    1. Spondylosis without myelopathy or radiculopathy, lumbar region  M47.816                      Subjective: pt reports he was walking around a lot yesterday and feels sore and stiff today. Objective: See treatment diary below      Assessment: Tolerated treatment well. Patient would benefit from continued PT. Added open books to improve mobility w/ good tolerance, some soreness reported. Plan: Continue per plan of care. Daily Treatment Diary    EPOC: 23  Precautions: HTN- takes meds; Hx of R partial TKA  Co- Morbidities: HTN- takes meds;  Hx of R partial TKA    Manuals    Consider TPR to lumbosacral area JH    RB- seated RB-seated RB- seated JH-seated RB- seated JH-seated                SI MET f/b shotgun tech       RB- R inn post rot JH- R inn post rot RB- R inn post rot    Total Time             There Ex    Pt ed             Progress Note         RB    Recumbent bike      4' w/ couple of breaks       Hip flexor stretch         NV standing    LTR 10" 10x ea b/l         10" 10x ea b/l   Hip ER stretch 15"x3 ea b/l         15"x3 ea b/l   Piriformis Stretch 15"x3 ea b/l         15"x3 ea b/l   Pball lumbar flexion stretch 10"x10 fwd/10"x5 L/R    10"x10 10"x10 fwd/10"x 5 L/R 10"x10 fwd/10"x5 L/R 10"x10 fwd/10"x5 L/R 10"x10 fwd/10"x5 L/R 10"x10 fwd/10"x5 L/R   Open books to tolerance 5" 10x            Neuro-Re-ed    DLS: isometric abdominals- TA     10"x10 seated 10"x10 seated  10"x10 seated  T/o session   DLS: isometric hip abduction w/ TA contraction             DLS: isometric hip adduction w/ TA contraction             DLS: fall outs w/ TA contraction       15x ea b/l  15x ea b/l      DLS: marches w/ TA contraction       15x ea b/l  15x ea b/l   15x ea   DLS: S/l hip abduction DLS: clamshells             Hip hinge- seated     1-2"x10 1-2"x 10 1-2"x 10      Ther Activity    STS     10x 10x 10x   10x   Step ups             Gait Training                              Modalities    CP PRN     10' h/l 10' h/l 10' h/l  10' h/l  10' h/l  10' h/l    TENS         10' h/l

## 2023-08-04 ENCOUNTER — OFFICE VISIT (OUTPATIENT)
Dept: PHYSICAL THERAPY | Facility: CLINIC | Age: 68
End: 2023-08-04
Payer: MEDICARE

## 2023-08-04 DIAGNOSIS — M47.816 SPONDYLOSIS WITHOUT MYELOPATHY OR RADICULOPATHY, LUMBAR REGION: Primary | ICD-10-CM

## 2023-08-04 PROCEDURE — 97110 THERAPEUTIC EXERCISES: CPT

## 2023-08-04 PROCEDURE — 97140 MANUAL THERAPY 1/> REGIONS: CPT

## 2023-08-07 ENCOUNTER — OFFICE VISIT (OUTPATIENT)
Dept: PHYSICAL THERAPY | Facility: CLINIC | Age: 68
End: 2023-08-07
Payer: MEDICARE

## 2023-08-07 DIAGNOSIS — M47.816 SPONDYLOSIS WITHOUT MYELOPATHY OR RADICULOPATHY, LUMBAR REGION: Primary | ICD-10-CM

## 2023-08-07 DIAGNOSIS — M48.062 PSEUDOCLAUDICATION SYNDROME: ICD-10-CM

## 2023-08-07 PROCEDURE — 97140 MANUAL THERAPY 1/> REGIONS: CPT | Performed by: PHYSICAL THERAPIST

## 2023-08-07 PROCEDURE — 97110 THERAPEUTIC EXERCISES: CPT | Performed by: PHYSICAL THERAPIST

## 2023-08-07 NOTE — PROGRESS NOTES
Daily Note     Today's date: 2023  Patient name: Nargis Fox  : 1955  MRN: [id number]  Referring provider: Kenia Uribe MD  Dx:   Encounter Diagnosis     ICD-10-CM    1. Spondylosis without myelopathy or radiculopathy, lumbar region  M47.816       2. Pseudoclaudication syndrome  M48.062                      Subjective: pt reports that he is scheduled for trigger point injections on Friday, . He reports doing too much walking this weekend and feels he is having more pain today for this reason; however overall he reports doing better vs usual for a Monday. Objective: See treatment diary below      Assessment: Tx session tolerated well as below. Progress as able NV. Plan: Continue per plan of care. Daily Treatment Diary    EPOC: 23  Precautions: HTN- takes meds; Hx of R partial TKA  Co- Morbidities: HTN- takes meds; Hx of R partial TKA    Manuals  8   Consider TPR to lumbosacral area JH RB   RB- seated RB-seated RB- seated JH-seated RB- seated JH-seated                SI MET f/b shotgun tech       RB- R inn post rot JH- R inn post rot RB- R inn post rot    Total Time             There Ex    Pt ed  RB TENS INSTRUCT           Progress Note         RB    Recumbent bike      4' w/ couple of breaks       Hip flexor stretch  NV standing!        NV standing    LTR 10" 10x ea b/l 10"x10 ea b/l        10" 10x ea b/l   Hip ER stretch 15"x3 ea b/l         15"x3 ea b/l   Piriformis Stretch 15"x3 ea b/l         15"x3 ea b/l   Pball lumbar flexion stretch 10"x10 fwd/10"x5 L/R 10"x10 fwd/10"x5 L/R   10"x10 10"x10 fwd/10"x 5 L/R 10"x10 fwd/10"x5 L/R 10"x10 fwd/10"x5 L/R 10"x10 fwd/10"x5 L/R 10"x10 fwd/10"x5 L/R   Open books to tolerance 5" 10x 5"x10           Neuro-Re-ed    DLS: isometric abdominals- TA     10"x10 seated 10"x10 seated  10"x10 seated  T/o session   DLS: isometric hip abduction w/ TA contraction             DLS: isometric hip adduction w/ TA contraction             DLS: fall outs w/ TA contraction       15x ea b/l  15x ea b/l      DLS: marches w/ TA contraction       15x ea b/l  15x ea b/l   15x ea   DLS: S/l hip abduction             DLS: clamshells             Hip hinge- seated     1-2"x10 1-2"x 10 1-2"x 10      Ther Activity    STS  10x   10x 10x 10x   10x   Step ups             Gait Training                              Modalities    CP PRN     10' h/l 10' h/l 10' h/l  10' h/l  10' h/l  10' h/l    TENS         10' h/l

## 2023-08-09 ENCOUNTER — OFFICE VISIT (OUTPATIENT)
Dept: PHYSICAL THERAPY | Facility: CLINIC | Age: 68
End: 2023-08-09
Payer: MEDICARE

## 2023-08-09 DIAGNOSIS — M47.816 SPONDYLOSIS WITHOUT MYELOPATHY OR RADICULOPATHY, LUMBAR REGION: Primary | ICD-10-CM

## 2023-08-09 PROCEDURE — 97110 THERAPEUTIC EXERCISES: CPT

## 2023-08-09 PROCEDURE — 97112 NEUROMUSCULAR REEDUCATION: CPT

## 2023-08-09 PROCEDURE — 97140 MANUAL THERAPY 1/> REGIONS: CPT

## 2023-08-09 NOTE — PROGRESS NOTES
Daily Note     Today's date: 2023  Patient name: Contreras Lawrence  : 1955  MRN: [id number]  Referring provider: Sebastien Jeong MD  Dx:   Encounter Diagnosis     ICD-10-CM    1. Spondylosis without myelopathy or radiculopathy, lumbar region  M47.816                      Subjective: pt reports he had a bad day yesterday. Objective: See treatment diary below      Assessment: Tolerated treatment well. Patient would benefit from continued PT. Did not progress to much due to increased pain. Added hip flexion stretch standing w/ good tolerance. Corrected posture to help reduce back pain. Patient introduced to the topic of pain neuroscience education and that improving knowledge of how pain works promotes improved recovery and rehabilitation. Current knowledge and understanding of patient on pain related topics was explored to create baseline. Patient was educated regarding endogenous mechanisms and strategies to increase the brain’s production of chemicals which decrease pain, such as aerobic exercise and improved pain knowledge. The concepts of pacing, graded exposure, ‘sore but safe’, and ‘hurt does not equal harm’ were discussed. Patient was educated on the concept of pain as an output of the brain, including nociception versus pain, inhibition and facilitation, and threat value using metaphors to promote deep learning. Plan: Continue per plan of care. Daily Treatment Diary    EPOC: 23  Precautions: HTN- takes meds; Hx of R partial TKA  Co- Morbidities: HTN- takes meds;  Hx of R partial TKA    Manuals  8   Consider TPR to lumbosacral area  RB JH  RB- seated RB-seated RB- seated JH-seated RB- seated JH-seated                SI MET f/b [employer] tech       RB- R inn post rot JH- R inn post rot RB- R inn post rot    Total Time             There Ex    Pt ed  RB TENS INSTRUCT           Progress Note         RB    Recumbent bike      4' w/ couple of breaks Hip flexor stretch  NV standing! 10" 3x      NV standing    LTR 10" 10x ea b/l 10"x10 ea b/l 10"x10 ea b/l       10" 10x ea b/l   Hip ER stretch 15"x3 ea b/l  15"x3 ea b/l       15"x3 ea b/l   Piriformis Stretch 15"x3 ea b/l  15"x3 ea b/l       15"x3 ea b/l   Pball lumbar flexion stretch 10"x10 fwd/10"x5 L/R 10"x10 fwd/10"x5 L/R 10"x10 fwd/10"x5 L/R  10"x10 10"x10 fwd/10"x 5 L/R 10"x10 fwd/10"x5 L/R 10"x10 fwd/10"x5 L/R 10"x10 fwd/10"x5 L/R 10"x10 fwd/10"x5 L/R   Open books to tolerance 5" 10x 5"x10 deferred          Neuro-Re-ed    DLS: isometric abdominals- TA     10"x10 seated 10"x10 seated  10"x10 seated  T/o session   DLS: isometric hip abduction w/ TA contraction             PNE   25'          DLS: isometric hip adduction w/ TA contraction             DLS: fall outs w/ TA contraction       15x ea b/l  15x ea b/l      DLS: marches w/ TA contraction       15x ea b/l  15x ea b/l   15x ea   DLS: S/l hip abduction             DLS: clamshells             Hip hinge- seated     1-2"x10 1-2"x 10 1-2"x 10      Ther Activity    STS  10x   10x 10x 10x   10x   Step ups             Gait Training                              Modalities    CP PRN     10' h/l 10' h/l 10' h/l  10' h/l  10' h/l  10' h/l    TENS         10' h/l

## 2023-08-11 ENCOUNTER — APPOINTMENT (OUTPATIENT)
Dept: PHYSICAL THERAPY | Facility: CLINIC | Age: 68
End: 2023-08-11
Payer: MEDICARE

## 2023-08-14 ENCOUNTER — APPOINTMENT (OUTPATIENT)
Dept: PHYSICAL THERAPY | Facility: CLINIC | Age: 68
End: 2023-08-14
Payer: MEDICARE

## 2023-08-16 ENCOUNTER — OFFICE VISIT (OUTPATIENT)
Dept: PHYSICAL THERAPY | Facility: CLINIC | Age: 68
End: 2023-08-16
Payer: MEDICARE

## 2023-08-16 DIAGNOSIS — M47.816 SPONDYLOSIS WITHOUT MYELOPATHY OR RADICULOPATHY, LUMBAR REGION: Primary | ICD-10-CM

## 2023-08-16 DIAGNOSIS — M48.062 PSEUDOCLAUDICATION SYNDROME: ICD-10-CM

## 2023-08-16 PROCEDURE — 97110 THERAPEUTIC EXERCISES: CPT | Performed by: PHYSICAL THERAPIST

## 2023-08-16 PROCEDURE — 97140 MANUAL THERAPY 1/> REGIONS: CPT | Performed by: PHYSICAL THERAPIST

## 2023-08-16 NOTE — PROGRESS NOTES
Daily Note     Today's date: 2023  Patient name: Myles Arteaga  : 1955  MRN: [id number]  Referring provider: Karyna Santiago MD  Dx:   Encounter Diagnosis     ICD-10-CM    1. Spondylosis without myelopathy or radiculopathy, lumbar region  M47.816       2. Pseudoclaudication syndrome  M48.062                      Subjective: pt had trigger point injections and reports that he has had less pain since then. Today he rates his pain at 6/10- states that it is the worst pain he has had since injections, but overall still feels better than he did. Objective: See treatment diary below. Pt 15' late - accommodated pt. Assessment: Tx session tolerated well as below. Spent a long period of time trying to find best position for pt to feel hip flexor stretch which ended up being standing w/ u/l LE on step. Focused on stretching this visit to ease pt back into program.  However, plan to resume strength training NV. Less tension palpated in psp musculature this visit vs at previous visits. Plan: Continue per plan of care. Daily Treatment Diary    EPOC: 23  Precautions: HTN- takes meds; Hx of R partial TKA  Co- Morbidities: HTN- takes meds; Hx of R partial TKA    Manuals  8   Consider TPR to lumbosacral area JH RB JH RB   RB- seated JH-seated RB- seated JH-seated                SI MET f/b [employer] tech       RB- R inn post rot JH- R inn post rot RB- R inn post rot    Total Time             There Ex    Pt ed  RB TENS INSTRUCT           Progress Note         RB    Recumbent bike             Hip flexor stretch  NV standing! 10" 3x 30"x3 on steps!      NV standing    LTR 10" 10x ea b/l 10"x10 ea b/l 10"x10 ea b/l 10"x10  Ea b/l       10" 10x ea b/l   Hip ER stretch 15"x3 ea b/l  15"x3 ea b/l 30"x3 ea b/l       15"x3 ea b/l   Piriformis Stretch 15"x3 ea b/l  15"x3 ea b/l 30"3 ea b/l       15"x3 ea b/l   Pball lumbar flexion stretch 10"x10 fwd/10"x5 L/R 10"x10 fwd/10"x5 L/R 10"x10 fwd/10"x5 L/R 10"x10 fwd/10" x 5 L/R   10"x10 fwd/10"x5 L/R 10"x10 fwd/10"x5 L/R 10"x10 fwd/10"x5 L/R 10"x10 fwd/10"x5 L/R   Open books to tolerance 5" 10x 5"x10 deferred 10"x10         Neuro-Re-ed    DLS: isometric abdominals- TA        10"x10 seated  T/o session   DLS: isometric hip abduction w/ TA contraction             PNE   25'          DLS: isometric hip adduction w/ TA contraction             DLS: fall outs w/ TA contraction       15x ea b/l  15x ea b/l      DLS: marches w/ TA contraction       15x ea b/l  15x ea b/l   15x ea   DLS: S/l hip abduction             DLS: clamshells             Hip hinge- seated       1-2"x 10      Ther Activity    STS  10x     10x   10x   Step ups             Gait Training                              Modalities    CP PRN       10' h/l  10' h/l  10' h/l  10' h/l    TENS         10' h/l

## 2023-08-18 ENCOUNTER — OFFICE VISIT (OUTPATIENT)
Dept: PHYSICAL THERAPY | Facility: CLINIC | Age: 68
End: 2023-08-18
Payer: MEDICARE

## 2023-08-18 DIAGNOSIS — M47.816 SPONDYLOSIS WITHOUT MYELOPATHY OR RADICULOPATHY, LUMBAR REGION: Primary | ICD-10-CM

## 2023-08-18 PROCEDURE — 97112 NEUROMUSCULAR REEDUCATION: CPT

## 2023-08-18 PROCEDURE — 97110 THERAPEUTIC EXERCISES: CPT

## 2023-08-18 PROCEDURE — 97140 MANUAL THERAPY 1/> REGIONS: CPT

## 2023-08-18 NOTE — PROGRESS NOTES
Daily Note     Today's date: 2023  Patient name: Jr Brennan  : 1955  MRN: [id number]  Referring provider: Bhavna Castellanos MD  Dx:   Encounter Diagnosis     ICD-10-CM    1. Spondylosis without myelopathy or radiculopathy, lumbar region  M47.816           Start Time: 1034          Subjective: pt reports he started to feel that pain in the two spots in his back the other day but it is less intense. Objective: See treatment diary below      Assessment: Tolerated treatment well. Patient would benefit from continued PT. Continued w/ current POC as listed below. Initiated gentle core strengthening w/ good tolerance, cueing for technique and sequencing t/o. Plan: Continue per plan of care. Daily Treatment Diary    EPOC: 23  Precautions: HTN- takes meds; Hx of R partial TKA  Co- Morbidities: HTN- takes meds; Hx of R partial TKA    Manuals    Consider TPR to lumbosacral area JH RB JH RB JH  RB- seated JH-seated RB- seated JH-seated                SI MET f/b shotgun tech       RB- R inn post rot JH- R inn post rot RB- R inn post rot    Total Time             There Ex    Pt ed  RB TENS INSTRUCT           Progress Note         RB    Recumbent bike             Hip flexor stretch  NV standing! 10" 3x 30"x3 on steps!      NV standing    LTR 10" 10x ea b/l 10"x10 ea b/l 10"x10 ea b/l 10"x10  Ea b/l  10"x10  Ea b/l      10" 10x ea b/l   Hip ER stretch 15"x3 ea b/l  15"x3 ea b/l 30"x3 ea b/l  30"3 ea b/l      15"x3 ea b/l   Piriformis Stretch 15"x3 ea b/l  15"x3 ea b/l 30"3 ea b/l  30"3 ea b/l      15"x3 ea b/l   Pball lumbar flexion stretch 10"x10 fwd/10"x5 L/R 10"x10 fwd/10"x5 L/R 10"x10 fwd/10"x5 L/R 10"x10 fwd/10" x 5 L/R 10"x10 fwd/10" x 5 L/R  10"x10 fwd/10"x5 L/R 10"x10 fwd/10"x5 L/R 10"x10 fwd/10"x5 L/R 10"x10 fwd/10"x5 L/R   Open books to tolerance 5" 10x 5"x10 deferred 10"x10 10"x10        Neuro-Re-ed    DLS: isometric abdominals- TA     10" 10x   10"x10 seated  T/o session   DLS: isometric hip abduction w/ TA contraction             PNE   25'          DLS: isometric hip adduction w/ TA contraction             DLS: fall outs w/ TA contraction     10x  15x ea b/l  15x ea b/l      DLS: marches w/ TA contraction     10x  15x ea b/l  15x ea b/l   15x ea   DLS: S/l hip abduction             DLS: clamshells             Hip hinge- seated       1-2"x 10      Ther Activity    STS  10x     10x   10x   Step ups             Gait Training                              Modalities    CP PRN       10' h/l  10' h/l  10' h/l  10' h/l    TENS         10' h/l

## 2023-08-21 ENCOUNTER — APPOINTMENT (OUTPATIENT)
Dept: PHYSICAL THERAPY | Facility: CLINIC | Age: 68
End: 2023-08-21
Payer: MEDICARE

## 2023-08-23 ENCOUNTER — OFFICE VISIT (OUTPATIENT)
Dept: PHYSICAL THERAPY | Facility: CLINIC | Age: 68
End: 2023-08-23
Payer: MEDICARE

## 2023-08-23 DIAGNOSIS — M47.816 SPONDYLOSIS WITHOUT MYELOPATHY OR RADICULOPATHY, LUMBAR REGION: Primary | ICD-10-CM

## 2023-08-23 PROCEDURE — 97140 MANUAL THERAPY 1/> REGIONS: CPT

## 2023-08-23 PROCEDURE — 97110 THERAPEUTIC EXERCISES: CPT

## 2023-08-23 NOTE — PROGRESS NOTES
Daily Note     Today's date: 2023  Patient name: Jerry Rich  : 1955  MRN: [id number]  Referring provider: Brianna Avalos MD  Dx:   Encounter Diagnosis     ICD-10-CM    1. Spondylosis without myelopathy or radiculopathy, lumbar region  M47.816           Start Time: 1200          Subjective: pt reports the trigger point injections have worn off. Stated the stiffness and pain as returned. Notes that he is trying accupuncture the beginning of September. Objective: See treatment diary below      Assessment: Tolerated treatment well. Patient would benefit from continued PT. Focused more of stretches to help improve mobility and decrease stiffness. Plan: Continue per plan of care. Daily Treatment Diary    EPOC: 23  Precautions: HTN- takes meds; Hx of R partial TKA  Co- Morbidities: HTN- takes meds; Hx of R partial TKA    Manuals    Consider TPR to lumbosacral area [facility]-seated                SI MET f/b [employer] tech             Total Time             There Ex    Pt ed  RB TENS INSTRUCT           Progress Note             Recumbent bike             Hip flexor stretch  NV standing! 10" 3x 30"x3 on steps!          LTR 10" 10x ea b/l 10"x10 ea b/l 10"x10 ea b/l 10"x10  Ea b/l  10"x10  Ea b/l  10"x10  Ea b/l     10" 10x ea b/l   Hip ER stretch 15"x3 ea b/l  15"x3 ea b/l 30"x3 ea b/l  30"3 ea b/l  30"3 ea b/l     15"x3 ea b/l   Piriformis Stretch 15"x3 ea b/l  15"x3 ea b/l 30"3 ea b/l  30"3 ea b/l  30"3 ea b/l     15"x3 ea b/l   Pball lumbar flexion stretch 10"x10 fwd/10"x5 L/R 10"x10 fwd/10"x5 L/R 10"x10 fwd/10"x5 L/R 10"x10 fwd/10" x 5 L/R 10"x10 fwd/10" x 5 L/R 10"x10 fwd/10" x 5 L/R    10"x10 fwd/10"x5 L/R   Open books to tolerance 5" 10x 5"x10 deferred 10"x10 10"x10 10"x10       Neuro-Re-ed    DLS: isometric abdominals- TA     10" 10x 10"x10    T/o session   DLS: isometric hip abduction w/ TA contraction             PNE   25'          DLS: isometric hip adduction w/ TA contraction             DLS: fall outs w/ TA contraction     10x 10x       DLS: marches w/ TA contraction     10x 10x    15x ea   DLS: S/l hip abduction             DLS: clamshells             Hip hinge- seated             Ther Activity    STS  10x        10x   Step ups             Gait Training                              Modalities    CP PRN          10' h/l    TENS

## 2023-08-25 ENCOUNTER — OFFICE VISIT (OUTPATIENT)
Dept: PHYSICAL THERAPY | Facility: CLINIC | Age: 68
End: 2023-08-25
Payer: MEDICARE

## 2023-08-25 DIAGNOSIS — M47.816 SPONDYLOSIS WITHOUT MYELOPATHY OR RADICULOPATHY, LUMBAR REGION: Primary | ICD-10-CM

## 2023-08-25 PROCEDURE — 97140 MANUAL THERAPY 1/> REGIONS: CPT

## 2023-08-25 PROCEDURE — 97110 THERAPEUTIC EXERCISES: CPT

## 2023-08-25 NOTE — PROGRESS NOTES
Daily Note     Today's date: 2023  Patient name: Mahnaz Hinton  : 1955  MRN: [id number]  Referring provider: Ruma Pelayo MD  Dx:   Encounter Diagnosis     ICD-10-CM    1. Spondylosis without myelopathy or radiculopathy, lumbar region  M47.816           Start Time: 1040          Subjective: pt reports he is in a lot of pain today. Reports he didn't sleep well due to pain. Objective: See treatment diary below      Assessment: Tolerated treatment well. Patient would benefit from continued PT. Did not progress due to increased pain and stiffness upon arrival.       Plan: Continue per plan of care. Daily Treatment Diary    EPOC: 23  Precautions: HTN- takes meds; Hx of R partial TKA  Co- Morbidities: HTN- takes meds; Hx of R partial TKA    Manuals    Consider TPR to lumbosacral area [facility]-seated                SI MET f/b [employer] tech             Total Time             There Ex    Pt ed  RB TENS INSTRUCT           Progress Note             Recumbent bike             Hip flexor stretch  NV standing! 10" 3x 30"x3 on steps!          LTR 10" 10x ea b/l 10"x10 ea b/l 10"x10 ea b/l 10"x10  Ea b/l  10"x10  Ea b/l  10"x10  Ea b/l  10"x10  Ea b/l    10" 10x ea b/l   Hip ER stretch 15"x3 ea b/l  15"x3 ea b/l 30"x3 ea b/l  30"3 ea b/l  30"3 ea b/l  30"3 ea b/l    15"x3 ea b/l   Piriformis Stretch 15"x3 ea b/l  15"x3 ea b/l 30"3 ea b/l  30"3 ea b/l  30"3 ea b/l  30"3 ea b/l    15"x3 ea b/l   Pball lumbar flexion stretch 10"x10 fwd/10"x5 L/R 10"x10 fwd/10"x5 L/R 10"x10 fwd/10"x5 L/R 10"x10 fwd/10" x 5 L/R 10"x10 fwd/10" x 5 L/R 10"x10 fwd/10" x 5 L/R 10"x10 fwd/10" x 5 L/R   10"x10 fwd/10"x5 L/R   Open books to tolerance 5" 10x 5"x10 deferred 10"x10 10"x10 10"x10 10"x10      Neuro-Re-ed    DLS: isometric abdominals- TA     10" 10x 10"x10 10"x10   T/o session   DLS: isometric hip abduction w/ TA contraction             PNE   25'          DLS: isometric hip adduction w/ TA contraction             DLS: fall outs w/ TA contraction     10x 10x nv      DLS: marches w/ TA contraction     10x 10x nv   15x ea   DLS: S/l hip abduction             DLS: clamshells             Hip hinge- seated             Ther Activity    STS  10x        10x   Step ups             Gait Training                              Modalities    CP PRN          10' h/l    TENS

## 2023-08-28 ENCOUNTER — APPOINTMENT (OUTPATIENT)
Dept: PHYSICAL THERAPY | Facility: CLINIC | Age: 68
End: 2023-08-28
Payer: MEDICARE

## 2023-08-30 ENCOUNTER — EVALUATION (OUTPATIENT)
Dept: PHYSICAL THERAPY | Facility: CLINIC | Age: 68
End: 2023-08-30
Payer: MEDICARE

## 2023-08-30 DIAGNOSIS — M48.062 PSEUDOCLAUDICATION SYNDROME: ICD-10-CM

## 2023-08-30 DIAGNOSIS — M47.816 SPONDYLOSIS WITHOUT MYELOPATHY OR RADICULOPATHY, LUMBAR REGION: Primary | ICD-10-CM

## 2023-08-30 PROCEDURE — 97110 THERAPEUTIC EXERCISES: CPT | Performed by: PHYSICAL THERAPIST

## 2023-08-30 PROCEDURE — 97140 MANUAL THERAPY 1/> REGIONS: CPT | Performed by: PHYSICAL THERAPIST

## 2023-08-30 NOTE — LETTER
2023    Agus Davis MD  [address] [facility] [address] [address]    Patient: Christen Lam   YOB: 1955   Date of Visit: 2023     Encounter Diagnosis     ICD-10-CM    1. Spondylosis without myelopathy or radiculopathy, lumbar region  M47.816       2. 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 Cathy Medina:    Thank you for your recent referral of Alvarez Holliday. Please review the attached evaluation summary from Alvarez's recent visit. Please verify that you agree with the plan of care by signing the attached order. If you have any questions or concerns, please do not hesitate to call. I sincerely appreciate the opportunity to share in the care of one of your patients and hope to have another opportunity to work with you in the near future. Sincerely,    Darren Doll, PT      Referring Provider:      I certify that I have read the below Plan of Care and certify the need for these services furnished under this plan of treatment while under my care. Agus Davis MD  [address] [facility] [address] [address]  Via Fax: [phone]          PT Re-evaluation     Today's date: 2023  Patient name: Christen Lam  : 1955  MRN: [id number]  Referring provider: Agus Davis MD  Dx:   Encounter Diagnosis     ICD-10-CM    1. Spondylosis without myelopathy or radiculopathy, lumbar region  M47.816       2. Pseudoclaudication syndrome  M48.062                      Assessment  Assessment details: Christen Lam is a 74 y.o. male presenting as an outpatient to [facility]. Jorge's PT w/ c/o lumbosacral pain post surgery. Since previous progress note, p t has made some gains in ROM, strength, and overall activity tolerance/function, but continues to remain limited from max function. Pt did well post trigger point injections done approximately 2.5 weeks ago, but feels that the relief has "worn off" since then.   Pt will continue to benefit from skilled PT services in order to address continued deficits and max function to allow pt to achieve goals in PT. Thank you. Impairments:activity intolerance, pain with function  Understanding of Dx/Px/POC: good   Prognosis: good    Goals  ST. Pain decreased by 25% in 4-6 weeks. -PROGRESSING   2. ROM increased by 25% in 4-6 weeks. - MET  3. Strength increased by 1/2 to 1 muscle grade in all deficient muscle groups in 4-6 weeks. -  MET    LT. Decrease pain to 1-2/10 at worst by d/c. - PROGRESSING  2. Increase ROM to [facility] for all deficient movements by d/c. PROGRESSING  3. Strength increased to 5 for all deficient muscle groups by d/c. PROGRESSING  4. IADL performance increased to max function by d/c. PROGRESSING  5. Recreational performance increased to max function by d/c. PROGRESSING  6. Ambulation/stair climbing improved to max level of function by d/c. PROGRESSING    Plan  Planned modality interventions: cryotherapy, TENS and thermotherapy: hydrocollator packs  Other planned modality interventions: other modalities PRN  Planned therapy interventions: abdominal trunk stabilization, ADL retraining, IADL retraining, balance, flexibility, functional ROM exercises, graded exercise, home exercise program, manual therapy, joint mobilization, neuromuscular re-education, patient education, postural training, strengthening, therapeutic exercise, therapeutic activities, transfer training and work reintegration  Other planned therapy interventions: other interventions PRN  Frequency: 2-3x/week. Duration in weeks: 4  Plan of Care beginning date: 23  Plan of Care expiration date: 23  Treatment plan discussed with: patient        Subjective Evaluation    History of Present Illness  CURRENT LEVEL (23)  Pt reports that he was doing well post trigger point injection approx 2 weeks ago, but felt that relief has "worn off".  Despite this feeling that pain has returned to a higher level again, pt reports being able to walk longer periods of time since last progress note (20-25 minutes vs 10-15 minutes previously). Although he was more painful after, he was able to do 5 loads of laundry up/down stairs while away at his home in [place] this weekend. He continues to have difficulty traveling long distances in the car, but can tolerate the ride better vs previously. He is concerned of continued b/l LE partial numbness after driving long distances. Instructed pt to relay this information to surgeon. PREVIOUS LEVEL (7/31/23)  With the exception of this past week, pt reports that pain levels had slowly been improving w/ skilled PT tx. However, Mondays continued to be the most painful for pt as it was 5 days since his LV. Pt was able to tolerate driving down to [place] (approx 2 hours) w/ rest breaks and intermittently needing to lie down in back of truck while wife drove. He reports doing well immediately after the drive down and attributes some of that to having skilled PT tx in the AM prior to driving down. He has been responding well to manuals in conjunction w/ exercise. However, he reports developing a significant amount of pain after standing all day at 1 of the 2 funerals he needed to attend which lasted into today. Pt admits that he had limited time for HEP during this time as well. Pt does not have stairs in [place] home, but was able to negotiate stairs in reciprocating fashion at home in [place] this past week. Prior to this week, pt reports that he has been compliant w/ HEP. INITIAL LEVEL (6/28/23)  Mechanism of injury: Pt reports to IE w/ c/o lumbosacral pain which began approximately 2 years ago possibly due to compensation for R knee w/ progressive worsening. Pt w/ hx of L4-L5 facet injections approx 1.5 years ago which lasted him approx 3 weeks. He had another set of injections, but pain returned. Pt had rhizotomy for R side done approx 1 year ago which helped LE symptoms significantly .  Pt had L sided rhizotomy 4-5 weeks later. Pt had MLD procedure done on L4-L5 in early April. Pt reports that he was restricted to < 1 hour driving initially. He ended up sitting in car for 4 hours due to poor weather a couple weeks post surgery and felt that his pain returned significantly, but more into sacral area. Pt underwent another MILD procedure done on  at L5-S1 - had initial significant improvement in symptoms, but pain and parasthesias returned since then. Pt reports intermittent parasthesias into anterior thighs. He adds that he feels that his legs may give out at times. He does not use AD. Otherwise, pain typically localized to lumbosacral area at this time. Pain  Current pain ratin/10  Best pain level: 3/10 (post injections, but states that it has worn off)  At worst pain rating: 10/10  Location: Lumbosacral area  Alleviating factors: lidocaine patches, Tramadol, trigger point injections  Exacerbated by: walking > 20-25 minutes, intermittently sleeping at night, stair climbing (reciprocating fashion), lifting (can do groceries now, but hasn't tried more heavy things), functional squat. Social Support  Stairs in house: yes (Only in [address] [place] [address])   Patient lives at: 1 story home in the [place]; 2 story home in [place] (in the process of moving from [place] to [place])  Lives with: spouse    Employment status: not working (retired- pt was an  for years)  Patient Goals  Patient goals for therapy: increased strength and decreased pain- Progressing      Objective     Active Range of Motion     Lumbar   Flexion: Active lumbar flexion: *lumbosacral soreness. Restriction level: WFL  Extension: Active lumbar extension: *lumbosacral pain.   Restriction level: WFL  Left lateral flexion:  WFL  Right lateral flexion:  WFL  Left rotation:  WFL  Right rotation:  [facility]    Strength/Myotome Testing     Left Hip   Planes of Motion   Flexion: 5 (*pain)    Right Hip   Planes of Motion   Flexion: 5 (*pain)    Left Knee   Flexion: 5  Extension: 5    Right Knee   Flexion: 5, *min back pain  Extension: 5    Left Ankle/Foot   Dorsiflexion: 5  Plantar flexion: 5  Great toe extension: 5    Right Ankle/Foot   Dorsiflexion: 5  Plantar flexion: 5  Great toe extension: 5    Tests     Additional Tests Details  Observation/Posture: Pt sits w/ PPT and fwd, rounded shoulders and fwd head    Palpation: Hypertonicity/tenderness w/ palpation to b/l l/s and lower t/s psp, b/l QL, b/l TFL    Special Tests (L/R):   SLR: negative/negative  Crossed SLR: negative/negative    HS Length (assessed via popliteal angle method- L/R): 27 deg/ 32 deg    SIJ:   Distraction: negative  Supine --> sit assessment/LLD: positive for right innominate posterior rotation    Supine Marches: Fair core strength    Daily Treatment Diary    EPOC: 9/27/23  Precautions: HTN- takes meds; Hx of R partial TKA  Co- Morbidities: HTN- takes meds; Hx of R partial TKA    Manuals 8/4 8/7 8/9 8/16 8/18 8/23 8/25 8/30 8/2   Consider TPR to lumbosacral area [facility] RB  RB [facility] RB- IASTM to b/l l/s psp and QL instead  JH-seated   Manual l/s traction w/ belt        RB + set up     SI MET f/b shotgun tech        RB     Total Time             There Ex    Pt ed  RB TENS INSTRUCT      RB- discussion about progress, symptoms, etc     Progress Note        RB     Recumbent bike             Active HS stretch w/ ankle pumps        10 pumps x 2     Hip flexor stretch  NV standing! 10" 3x 30"x3 on steps!          LTR 10" 10x ea b/l 10"x10 ea b/l 10"x10 ea b/l 10"x10  Ea b/l  10"x10  Ea b/l  10"x10  Ea b/l  10"x10  Ea b/l    10" 10x ea b/l   Hip ER stretch 15"x3 ea b/l  15"x3 ea b/l 30"x3 ea b/l  30"3 ea b/l  30"3 ea b/l  30"3 ea b/l    15"x3 ea b/l   Piriformis Stretch 15"x3 ea b/l  15"x3 ea b/l 30"3 ea b/l  30"3 ea b/l  30"3 ea b/l  30"3 ea b/l    15"x3 ea b/l   Pball lumbar flexion stretch 10"x10 fwd/10"x5 L/R 10"x10 fwd/10"x5 L/R 10"x10 fwd/10"x5 L/R 10"x10 fwd/10" x 5 L/R 10"x10 fwd/10" x 5 L/R 10"x10 fwd/10" x 5 L/R 10"x10 fwd/10" x 5 L/R   10"x10 fwd/10"x5 L/R   Open books to tolerance 5" 10x 5"x10 deferred 10"x10 10"x10 10"x10 10"x10      Neuro-Re-ed    DLS: isometric abdominals- TA     10" 10x 10"x10 10"x10   T/o session   DLS: isometric hip abduction w/ TA contraction             PNE   25'          DLS: isometric hip adduction w/ TA contraction             DLS: fall outs w/ TA contraction     10x 10x nv      DLS: marches w/ TA contraction     10x 10x nv   15x ea   DLS: S/l hip abduction             DLS: clamshells             Hip hinge- seated             Ther Activity    STS  10x        10x   Step ups             Gait Training                              Modalities    CP PRN        10' h/l   10' h/l    TENS

## 2023-08-30 NOTE — PROGRESS NOTES
PT Re-evaluation     Today's date: 2023  Patient name: Del Matamoros  : 1955  MRN: [id number]  Referring provider: Wandy Smith MD  Dx:   Encounter Diagnosis     ICD-10-CM    1. Spondylosis without myelopathy or radiculopathy, lumbar region  M47.816       2. Pseudoclaudication syndrome  M48.062                      Assessment  Assessment details: Del Matamoros is a 74 y.o. male presenting as an outpatient to [facility] w/ c/o lumbosacral pain post surgery. Since previous progress note, p t has made some gains in ROM, strength, and overall activity tolerance/function, but continues to remain limited from max function. Pt did well post trigger point injections done approximately 2.5 weeks ago, but feels that the relief has "worn off" since then. Pt will continue to benefit from skilled PT services in order to address continued deficits and max function to allow pt to achieve goals in PT. Thank you. Impairments:activity intolerance, pain with function  Understanding of Dx/Px/POC: good   Prognosis: good    Goals  ST. Pain decreased by 25% in 4-6 weeks. -PROGRESSING   2. ROM increased by 25% in 4-6 weeks. - MET  3. Strength increased by 1/2 to 1 muscle grade in all deficient muscle groups in 4-6 weeks. -  MET    LT. Decrease pain to 1-2/10 at worst by d/c. - PROGRESSING  2. Increase ROM to [facility] for all deficient movements by d/c. PROGRESSING  3. Strength increased to 5 for all deficient muscle groups by d/c. PROGRESSING  4. IADL performance increased to max function by d/c. PROGRESSING  5. Recreational performance increased to max function by d/c. PROGRESSING  6. Ambulation/stair climbing improved to max level of function by d/c.  PROGRESSING    Plan  Planned modality interventions: cryotherapy, TENS and thermotherapy: hydrocollator packs  Other planned modality interventions: other modalities PRN  Planned therapy interventions: abdominal trunk stabilization, ADL retraining, IADL retraining, balance, flexibility, functional ROM exercises, graded exercise, home exercise program, manual therapy, joint mobilization, neuromuscular re-education, patient education, postural training, strengthening, therapeutic exercise, therapeutic activities, transfer training and work reintegration  Other planned therapy interventions: other interventions PRN  Frequency: 2-3x/week. Duration in weeks: 4  Plan of Care beginning date: 8/30/23  Plan of Care expiration date: 9/27/23  Treatment plan discussed with: patient        Subjective Evaluation    History of Present Illness  CURRENT LEVEL (8/30/23)  Pt reports that he was doing well post trigger point injection approx 2 weeks ago, but felt that relief has "worn off". Despite this feeling that pain has returned to a higher level again, pt reports being able to walk longer periods of time since last progress note (20-25 minutes vs 10-15 minutes previously). Although he was more painful after, he was able to do 5 loads of laundry up/down stairs while away at his home in [facility] this weekend. He continues to have difficulty traveling long distances in the car, but can tolerate the ride better vs previously. He is concerned of continued b/l LE partial numbness after driving long distances. Instructed pt to relay this information to surgeon. PREVIOUS LEVEL (7/31/23)  With the exception of this past week, pt reports that pain levels had slowly been improving w/ skilled PT tx. However, Mondays continued to be the most painful for pt as it was 5 days since his LV. Pt was able to tolerate driving down to [place] (approx 2 hours) w/ rest breaks and intermittently needing to lie down in back of truck while wife drove. He reports doing well immediately after the drive down and attributes some of that to having skilled PT tx in the AM prior to driving down. He has been responding well to manuals in conjunction w/ exercise.   However, he reports developing a significant amount of pain after standing all day at 1 of the 2 funerals he needed to attend which lasted into today. Pt admits that he had limited time for HEP during this time as well. Pt does not have stairs in [place] home, but was able to negotiate stairs in reciprocating fashion at home in [facility] this past week. Prior to this week, pt reports that he has been compliant w/ HEP. INITIAL LEVEL (23)  Mechanism of injury: Pt reports to IE w/ c/o lumbosacral pain which began approximately 2 years ago possibly due to compensation for R knee w/ progressive worsening. Pt w/ hx of L4-L5 facet injections approx 1.5 years ago which lasted him approx 3 weeks. He had another set of injections, but pain returned. Pt had rhizotomy for R side done approx 1 year ago which helped LE symptoms significantly . Pt had L sided rhizotomy 4-5 weeks later. Pt had MLD procedure done on L4-L5 in early April. Pt reports that he was restricted to < 1 hour driving initially. He ended up sitting in car for 4 hours due to poor weather a couple weeks post surgery and felt that his pain returned significantly, but more into sacral area. Pt underwent another MILD procedure done on  at L5-S1 - had initial significant improvement in symptoms, but pain and parasthesias returned since then. Pt reports intermittent parasthesias into anterior thighs. He adds that he feels that his legs may give out at times. He does not use AD. Otherwise, pain typically localized to lumbosacral area at this time.      Pain  Current pain ratin/10  Best pain level: 3/10 (post injections, but states that it has worn off)  At worst pain rating: 10/10  Location: Lumbosacral area  Alleviating factors: lidocaine patches, Tramadol, trigger point injections  Exacerbated by: walking > 20-25 minutes, intermittently sleeping at night, stair climbing (reciprocating fashion), lifting (can do groceries now, but hasn't tried more heavy things), functional squat. Social Support  Stairs in house: yes (Only in [address] [place] [address])   Patient lives at: 1 story home in the [place]; 2 story home in [facility] (in the process of moving from [facility] to [place])  Lives with: spouse    Employment status: not working (retired- pt was an  for years)  Patient Goals  Patient goals for therapy: increased strength and decreased pain- Progressing      Objective     Active Range of Motion     Lumbar   Flexion: Active lumbar flexion: *lumbosacral soreness. Restriction level: WFL  Extension: Active lumbar extension: *lumbosacral pain. Restriction level: WFL  Left lateral flexion:  WFL  Right lateral flexion:  WFL  Left rotation:  [facility]  Right rotation:  [facility]    Strength/Myotome Testing     Left Hip   Planes of Motion   Flexion: 5 (*pain)    Right Hip   Planes of Motion   Flexion: 5 (*pain)    Left Knee   Flexion: 5  Extension: 5    Right Knee   Flexion: 5, *min back pain  Extension: 5    Left Ankle/Foot   Dorsiflexion: 5  Plantar flexion: 5  Great toe extension: 5    Right Ankle/Foot   Dorsiflexion: 5  Plantar flexion: 5  Great toe extension: 5    Tests     Additional Tests Details  Observation/Posture: Pt sits w/ PPT and fwd, rounded shoulders and fwd head    Palpation: Hypertonicity/tenderness w/ palpation to b/l l/s and lower t/s psp, b/l QL, b/l TFL    Special Tests (L/R):   SLR: negative/negative  Crossed SLR: negative/negative    HS Length (assessed via popliteal angle method- L/R): 27 deg/ 32 deg    SIJ:   Distraction: negative  Supine --> sit assessment/LLD: positive for right innominate posterior rotation    Supine Marches: Fair core strength    Daily Treatment Diary    EPOC: 9/27/23  Precautions: HTN- takes meds; Hx of R partial TKA  Co- Morbidities: HTN- takes meds;  Hx of R partial TKA    Manuals 8/4 8/7 8/9 8/16 8/18 8/23 8/25 8/30 8/2   Consider TPR to lumbosacral area [facility] BRADLEY RB  RB [facility] RB- IASTM to b/l l/s psp and QL instead  JH-seated   Manual l/s traction w/ belt        RB + set up     SI MET f/b shotgun tech        RB     Total Time             There Ex    Pt ed  RB TENS INSTRUCT      RB- discussion about progress, symptoms, etc     Progress Note        RB     Recumbent bike             Active HS stretch w/ ankle pumps        10 pumps x 2     Hip flexor stretch  NV standing! 10" 3x 30"x3 on steps!          LTR 10" 10x ea b/l 10"x10 ea b/l 10"x10 ea b/l 10"x10  Ea b/l  10"x10  Ea b/l  10"x10  Ea b/l  10"x10  Ea b/l    10" 10x ea b/l   Hip ER stretch 15"x3 ea b/l  15"x3 ea b/l 30"x3 ea b/l  30"3 ea b/l  30"3 ea b/l  30"3 ea b/l    15"x3 ea b/l   Piriformis Stretch 15"x3 ea b/l  15"x3 ea b/l 30"3 ea b/l  30"3 ea b/l  30"3 ea b/l  30"3 ea b/l    15"x3 ea b/l   Pball lumbar flexion stretch 10"x10 fwd/10"x5 L/R 10"x10 fwd/10"x5 L/R 10"x10 fwd/10"x5 L/R 10"x10 fwd/10" x 5 L/R 10"x10 fwd/10" x 5 L/R 10"x10 fwd/10" x 5 L/R 10"x10 fwd/10" x 5 L/R   10"x10 fwd/10"x5 L/R   Open books to tolerance 5" 10x 5"x10 deferred 10"x10 10"x10 10"x10 10"x10      Neuro-Re-ed    DLS: isometric abdominals- TA     10" 10x 10"x10 10"x10   T/o session   DLS: isometric hip abduction w/ TA contraction             PNE   25'          DLS: isometric hip adduction w/ TA contraction             DLS: fall outs w/ TA contraction     10x 10x nv      DLS: marches w/ TA contraction     10x 10x nv   15x ea   DLS: S/l hip abduction             DLS: clamshells             Hip hinge- seated             Ther Activity    STS  10x        10x   Step ups             Gait Training                              Modalities    CP PRN        10' h/l   10' h/l    TENS

## 2023-09-01 ENCOUNTER — OFFICE VISIT (OUTPATIENT)
Dept: PHYSICAL THERAPY | Facility: CLINIC | Age: 68
End: 2023-09-01
Payer: MEDICARE

## 2023-09-01 DIAGNOSIS — M47.816 SPONDYLOSIS WITHOUT MYELOPATHY OR RADICULOPATHY, LUMBAR REGION: Primary | ICD-10-CM

## 2023-09-01 PROCEDURE — 97140 MANUAL THERAPY 1/> REGIONS: CPT

## 2023-09-01 PROCEDURE — 97110 THERAPEUTIC EXERCISES: CPT

## 2023-09-01 NOTE — PROGRESS NOTES
Daily Note     Today's date: 2023  Patient name: Thomas Gillespie  : 1955  MRN: [id number]  Referring provider: Oscar Gongora MD  Dx:   Encounter Diagnosis     ICD-10-CM    1. Spondylosis without myelopathy or radiculopathy, lumbar region  M47.816                      Subjective: pt reports he was very sore yesterday. Stated that he felt better post previous session. Objective: See treatment diary below      Assessment: Tolerated treatment well. Patient would benefit from continued PT. Pt continues to respond well to manual traction. Improved sxs post manual therapy and active stretches. Plan: Continue per plan of care. Daily Treatment Diary    EPOC: 23  Precautions: HTN- takes meds; Hx of R partial TKA  Co- Morbidities: HTN- takes meds; Hx of R partial TKA    Manuals     Consider TPR to lumbosacral area [facility] RB  RB [facility] RB- IASTM to b/l l/s psp and QL instead     Manual l/s traction w/ belt        RB + set up [employer] + set up    SI MET f/b shotgun tech        RB     Total Time             There Ex    Pt ed  RB TENS INSTRUCT      RB- discussion about progress, symptoms, etc     Progress Note        RB     Recumbent bike             Active HS stretch w/ ankle pumps        10 pumps x 2     Hip flexor stretch  NV standing! 10" 3x 30"x3 on steps!          LTR 10" 10x ea b/l 10"x10 ea b/l 10"x10 ea b/l 10"x10  Ea b/l  10"x10  Ea b/l  10"x10  Ea b/l  10"x10  Ea b/l   10"x10  Ea b/l     Hip ER stretch 15"x3 ea b/l  15"x3 ea b/l 30"x3 ea b/l  30"3 ea b/l  30"3 ea b/l  30"3 ea b/l   30"3 ea b/l     Piriformis Stretch 15"x3 ea b/l  15"x3 ea b/l 30"3 ea b/l  30"3 ea b/l  30"3 ea b/l  30"3 ea b/l   30"3 ea b/l     Pball lumbar flexion stretch 10"x10 fwd/10"x5 L/R 10"x10 fwd/10"x5 L/R 10"x10 fwd/10"x5 L/R 10"x10 fwd/10" x 5 L/R 10"x10 fwd/10" x 5 L/R 10"x10 fwd/10" x 5 L/R 10"x10 fwd/10" x 5 L/R  10"x10 fwd/10" x 5 L/R    Open books to tolerance 5" 10x 5"x10 deferred 10"x10 10"x10 10"x10 10"x10      Neuro-Re-ed    DLS: isometric abdominals- TA     10" 10x 10"x10 10"x10      DLS: isometric hip abduction w/ TA contraction             PNE   25'          DLS: isometric hip adduction w/ TA contraction             DLS: fall outs w/ TA contraction     10x 10x nv      DLS: marches w/ TA contraction     10x 10x nv      DLS: S/l hip abduction             DLS: clamshells             Hip hinge- seated             Ther Activity    STS  10x           Step ups             Gait Training                              Modalities    CP PRN        10' h/l      TENS

## 2023-09-06 ENCOUNTER — OFFICE VISIT (OUTPATIENT)
Dept: PHYSICAL THERAPY | Facility: CLINIC | Age: 68
End: 2023-09-06
Payer: MEDICARE

## 2023-09-06 DIAGNOSIS — M47.816 SPONDYLOSIS WITHOUT MYELOPATHY OR RADICULOPATHY, LUMBAR REGION: Primary | ICD-10-CM

## 2023-09-06 DIAGNOSIS — M48.062 PSEUDOCLAUDICATION SYNDROME: ICD-10-CM

## 2023-09-06 PROCEDURE — 97112 NEUROMUSCULAR REEDUCATION: CPT | Performed by: PHYSICAL THERAPIST

## 2023-09-06 PROCEDURE — 97140 MANUAL THERAPY 1/> REGIONS: CPT | Performed by: PHYSICAL THERAPIST

## 2023-09-06 PROCEDURE — 97110 THERAPEUTIC EXERCISES: CPT | Performed by: PHYSICAL THERAPIST

## 2023-09-06 NOTE — PROGRESS NOTES
Daily Note        Today's date: 2023  Patient name: Skinny Edmonds  : 1955  MRN: [id number]  Referring provider: Saskia Loyola MD  Dx:   Encounter Diagnosis     ICD-10-CM    1. Spondylosis without myelopathy or radiculopathy, lumbar region  M47.816       2. Pseudoclaudication syndrome  M48.062                      Subjective: Pt reports doing very well after traction LV. He also underwent acupuncture yesterday, but reports that he should notice more of the effects in a couple more days. Objective: See treatment diary below      Assessment: Tx session tolerated well. Improved gait post tx session. Continue w/ progression to more functional activities. Plan: Continue per plan of care. Daily Treatment Diary    EPOC: 23  Precautions: HTN- takes meds; Hx of R partial TKA  Co- Morbidities: HTN- takes meds;  Hx of R partial TKA    Manuals    [facility] to b/l l/s psp and QL instead  RB- IASTM to b/l l/s psp and QL   Manual l/s traction w/ belt   RB + set up [employer] + set up RB + set up   SI MET f/b [employer] tech   RB     Total Time        There Ex    Pt ed   RB- discussion about progress, symptoms, etc     Progress Note   RB     Recumbent bike        Active HS stretch w/ ankle pumps   10 pumps x 2     Hip flexor stretch        LTR 10"x10  Ea b/l  10"x10  Ea b/l   10"x10  Ea b/l     Hip ER stretch 30"3 ea b/l  30"3 ea b/l   30"3 ea b/l     Piriformis Stretch 30"3 ea b/l  30"3 ea b/l   30"3 ea b/l     Pball lumbar flexion stretch 10"x10 fwd/10" x 5 L/R 10"x10 fwd/10" x 5 L/R  10"x10 fwd/10" x 5 L/R 10"x10 fwd/10"x5 L/R   Open books to tolerance 10"x10 10"x10      Neuro-Re-ed    DLS: isometric abdominals- TA 10"x10 10"x10      DLS: isometric hip abduction w/ TA contraction        PNE        DLS: isometric hip adduction w/ TA contraction        DLS: fall outs w/ TA contraction 10x nv      DLS: marches w/ TA contraction 10x nv      DLS: S/l hip abduction        TB resisted sidestepping     RTB 2 laps ea dir b/l    TB resisted      RTB 10x flex/abd b/l    DLS: clamshells        Hip hinge- seated     1-2"x5   Ther Activity    STS     10x   Step ups        Gait Training                    Modalities    CP PRN   10' h/l      TENS

## 2023-09-08 ENCOUNTER — OFFICE VISIT (OUTPATIENT)
Dept: PHYSICAL THERAPY | Facility: CLINIC | Age: 68
End: 2023-09-08
Payer: MEDICARE

## 2023-09-08 DIAGNOSIS — M47.816 SPONDYLOSIS WITHOUT MYELOPATHY OR RADICULOPATHY, LUMBAR REGION: Primary | ICD-10-CM

## 2023-09-08 PROCEDURE — 97140 MANUAL THERAPY 1/> REGIONS: CPT

## 2023-09-08 PROCEDURE — 97112 NEUROMUSCULAR REEDUCATION: CPT

## 2023-09-08 PROCEDURE — 97110 THERAPEUTIC EXERCISES: CPT

## 2023-09-08 NOTE — PROGRESS NOTES
Daily Note     Today's date: 2023  Patient name: Chrissy Sneed  : 1955  MRN: [id number]  Referring provider: Walt Gaona MD  Dx:   Encounter Diagnosis     ICD-10-CM    1. Spondylosis without myelopathy or radiculopathy, lumbar region  M47.816                      Subjective: pt reports he was very sore yesterday but felt okay post previous session. Objective: See treatment diary below      Assessment: Tolerated treatment well. Patient would benefit from continued PT. Revisited PNE education. Continued w/ current POC as listed below, did not progress in order to build up tolerance to strengthening exercises. Plan: Continue per plan of care. Daily Treatment Diary    EPOC: 23  Precautions: HTN- takes meds; Hx of R partial TKA  Co- Morbidities: HTN- takes meds;  Hx of R partial TKA    Manuals    Consider TPR to lumbosacral area JH-IASTM to b/l l/s psp    RB- IASTM to b/l l/s psp and QL   Manual l/s traction w/ belt JH + set up   [employer] BRADLEY + set up RB + set up   SI MET f/b shotgun tech        Total Time        There Ex    Pt ed        Progress Note        Recumbent bike        Active HS stretch w/ ankle pumps        Hip flexor stretch        LTR    10"x10  Ea b/l     Hip ER stretch    30"3 ea b/l     Piriformis Stretch    30"3 ea b/l     Pball lumbar flexion stretch 10"x10 fwd/10"x5 L/R   10"x10 fwd/10" x 5 L/R 10"x10 fwd/10"x5 L/R   Open books to tolerance        Neuro-Re-ed    DLS: isometric abdominals- TA        DLS: isometric hip abduction w/ TA contraction        PNE JH       DLS: isometric hip adduction w/ TA contraction        DLS: fall outs w/ TA contraction        DLS: marches w/ TA contraction        DLS: S/l hip abduction        TB resisted sidestepping RTB 2 laps ea dir b/l     RTB 2 laps ea dir b/l    TB resisted  RTB 10x flex/abd b/l     RTB 10x flex/abd b/l    DLS: clamshells        Hip hinge- seated 10x    1-2"x5   Ther Activity    STS 10x    10x   Step ups        Gait Training                    Modalities    CP PRN        TENS

## 2023-09-11 ENCOUNTER — OFFICE VISIT (OUTPATIENT)
Dept: PHYSICAL THERAPY | Facility: CLINIC | Age: 68
End: 2023-09-11
Payer: COMMERCIAL

## 2023-09-11 DIAGNOSIS — M48.062 PSEUDOCLAUDICATION SYNDROME: ICD-10-CM

## 2023-09-11 DIAGNOSIS — M47.816 SPONDYLOSIS WITHOUT MYELOPATHY OR RADICULOPATHY, LUMBAR REGION: Primary | ICD-10-CM

## 2023-09-11 PROCEDURE — 97112 NEUROMUSCULAR REEDUCATION: CPT | Performed by: PHYSICAL THERAPIST

## 2023-09-11 PROCEDURE — 97110 THERAPEUTIC EXERCISES: CPT | Performed by: PHYSICAL THERAPIST

## 2023-09-11 PROCEDURE — 97140 MANUAL THERAPY 1/> REGIONS: CPT | Performed by: PHYSICAL THERAPIST

## 2023-09-11 NOTE — PROGRESS NOTES
Daily Note     Today's date: 2023  Patient name: Tarik Miller  : 1955  MRN: [id number]  Referring provider: Henry Georges MD  Dx:   Encounter Diagnosis     ICD-10-CM    1. Spondylosis without myelopathy or radiculopathy, lumbar region  M47.816       2. Pseudoclaudication syndrome  M48.062                      Subjective: Pt reports that he is sore- attributes to adding new exercises. However, he states that he wants to push through if it will help him feel better. Discussed w/ pt that it was ok to push through minor pain, but not moderate to severe pain to avoid further irritation. Objective: See treatment diary below. 4' unbilled due to bathroom break    Assessment: Exercises tolerated fairly well today. Concluded session w Lakisha Come and IASTM- pt reports soreness mostly resolved after      Plan: Continue per plan of care. Daily Treatment Diary    EPOC: 23  Precautions: HTN- takes meds; Hx of R partial TKA  Co- Morbidities: HTN- takes meds;  Hx of R partial TKA    Manuals    Consider TPR to lumbosacral area JH-IASTM to b/l l/s psp RB- IASTM to b/l l/s psp and QL   RB- IASTM to b/l l/s psp and QL   Manual l/s traction w/ belt JH + set up rB + set up  [employer] BRADLEY + set up RB + set up   SI MET f/b [employer] tech        Total Time        There Ex    Pt ed        Progress Note        Recumbent bike        Active HS stretch w/ ankle pumps        Hip flexor stretch        LTR    10"x10  Ea b/l     Hip ER stretch    30"3 ea b/l     Piriformis Stretch    30"3 ea b/l     Pball lumbar flexion stretch 10"x10 fwd/10"x5 L/R 10"x10 fwd/10"x5 L/R  10"x10 fwd/10" x 5 L/R 10"x10 fwd/10"x5 L/R   Open books to tolerance        Neuro-Re-ed    DLS: isometric abdominals- TA        DLS: isometric hip abduction w/ TA contraction        PNE JH       DLS: isometric hip adduction w/ TA contraction        DLS: fall outs w/ TA contraction        DLS: marches w/ TA contraction        DLS: S/l hip abduction        TB resisted sidestepping RTB 2 laps ea dir b/l  RTB 2 laps ea dir b/l    RTB 2 laps ea dir b/l    TB resisted hip strengthening RTB 10x flex/abd b/l  RTB 15x flex/abd/ext   RTB 10x flex/abd b/l    Lat step ups        DLS: claadrils        Hip hinge- seated 10x 10x   1-2"x5   Ther Activity    STS 10x 10x   10x   Step ups        Gait Training                    Modalities    CP PRN  10'      TENS

## 2023-09-13 ENCOUNTER — OFFICE VISIT (OUTPATIENT)
Dept: PHYSICAL THERAPY | Facility: CLINIC | Age: 68
End: 2023-09-13
Payer: COMMERCIAL

## 2023-09-13 DIAGNOSIS — M47.816 SPONDYLOSIS WITHOUT MYELOPATHY OR RADICULOPATHY, LUMBAR REGION: Primary | ICD-10-CM

## 2023-09-13 DIAGNOSIS — M48.062 PSEUDOCLAUDICATION SYNDROME: ICD-10-CM

## 2023-09-13 PROCEDURE — 97140 MANUAL THERAPY 1/> REGIONS: CPT | Performed by: PHYSICAL THERAPIST

## 2023-09-13 PROCEDURE — 97110 THERAPEUTIC EXERCISES: CPT | Performed by: PHYSICAL THERAPIST

## 2023-09-13 NOTE — PROGRESS NOTES
Daily Note     Today's date: 2023  Patient name: Danielle Cantu  : 1955  MRN: [id number]  Referring provider: Namrata Mark MD  Dx:   Encounter Diagnosis     ICD-10-CM    1. Spondylosis without myelopathy or radiculopathy, lumbar region  M47.816       2. Pseudoclaudication syndrome  M48.062                      Subjective: Pt reports being more painful today and "almost called out". However, he reports that he did not as he usually feels better leaving tx sessions. Discussed TENS unit in breaking pain-spasm-pain cycle, but pt reports that he has not been using very often, most recently because it needs to be charged. Pt reports that he will bring to his NV to ensure proper use. Objective: See treatment diary below. 4' unbilled due to bathroom break    Assessment: Focused on manuals/stretching due to increased pain upon arrival to tx session. Pt reports feeling better after leaving tx session. Had pt perform supine ex on CP. Plan: Continue per plan of care. Resume standing ex as able NV. Daily Treatment Diary    EPOC: 23  Precautions: HTN- takes meds; Hx of R partial TKA  Co- Morbidities: HTN- takes meds;  Hx of R partial TKA    Manuals    Consider TPR to lumbosacral area JH-IASTM to b/l l/s psp RB- IASTM to b/l l/s psp and QL RB- IASTM to b/l l/s psp and QL  RB- IASTM to b/l l/s psp and QL   Manual l/s traction w/ belt JH + set up rB + set up RB + setup [employer] + set up RB + set up   SI MET f/b [employer] tech        Total Time        There Ex    Pt ed   RB- discussion about pain and pain/spasm/pain cycle; treatments     Progress Note        Recumbent bike        Active HS stretch w/ ankle pumps        Hip flexor stretch        LTR   10"x10 ea b/l  10"x10  Ea b/l     Hip ER stretch   30"x3 ea b/l  30"3 ea b/l     Piriformis Stretch    30"3 ea b/l     Pball lumbar flexion stretch 10"x10 fwd/10"x5 L/R 10"x10 fwd/10"x5 L/R 10"x10 fwd/10"x5 L/R 10"x10 fwd/10" x 5 L/R 10"x10 fwd/10"x5 L/R   Open books to tolerance        Neuro-Re-ed    DLS: isometric abdominals- TA        DLS: isometric hip abduction w/ TA contraction        PNE JH       DLS: isometric hip adduction w/ TA contraction        DLS: fall outs w/ TA contraction        DLS: marches w/ TA contraction        DLS: S/l hip abduction        TB resisted sidestepping RTB 2 laps ea dir b/l  RTB 2 laps ea dir b/l    RTB 2 laps ea dir b/l    [place] walk outs   NV (may conside instead of tB resisted pending response to holding LV)     TB resisted hip strengthening RTB 10x flex/abd b/l  RTB 15x flex/abd/ext   RTB 10x flex/abd b/l    Lat step ups        DLS: clamshells        Hip hinge- seated 10x 10x   1-2"x5   Ther Activity    STS 10x 10x   10x   Step ups        Gait Training                    Modalities    CP PRN  10' 10' during supine ex     TENS

## 2023-09-15 ENCOUNTER — OFFICE VISIT (OUTPATIENT)
Dept: PHYSICAL THERAPY | Facility: CLINIC | Age: 68
End: 2023-09-15
Payer: COMMERCIAL

## 2023-09-15 DIAGNOSIS — M47.816 SPONDYLOSIS WITHOUT MYELOPATHY OR RADICULOPATHY, LUMBAR REGION: Primary | ICD-10-CM

## 2023-09-15 DIAGNOSIS — M48.062 PSEUDOCLAUDICATION SYNDROME: ICD-10-CM

## 2023-09-15 PROCEDURE — 97140 MANUAL THERAPY 1/> REGIONS: CPT

## 2023-09-15 PROCEDURE — 97110 THERAPEUTIC EXERCISES: CPT

## 2023-09-15 NOTE — PROGRESS NOTES
Daily Note     Today's date: 9/15/2023  Patient name: Chrissy Sneed  : 1955  MRN: [id number]  Referring provider: Walt Gaona MD  Dx:   Encounter Diagnosis     ICD-10-CM    1. Spondylosis without myelopathy or radiculopathy, lumbar region  M47.816       2. Pseudoclaudication syndrome  M48.062                      Subjective: Pt reports that he is feeling better compared to the last 2 session he was here. Pt reports that he will bring to his TENS unit NV to ensure proper use since he forgot it today. Objective: See treatment diary below. Assessment: Continued to focused on manuals/stretching due to good response last session. Pt reports feeling better after leaving tx session as well as today. He is responding well to traction. Plan: Continue per plan of care. Resume standing ex as able NV. Daily Treatment Diary    EPOC: 23  Precautions: HTN- takes meds; Hx of R partial TKA  Co- Morbidities: HTN- takes meds;  Hx of R partial TKA    Manuals 9/8 9/11 9/13 9/15    Consider TPR to lumbosacral area JH-IASTM to b/l l/s psp RB- IASTM to b/l l/s psp and QL RB- IASTM to b/l l/s psp and QL MM- IASTM to b/l l/s psp and QL    Manual l/s traction w/ belt JH + set up rB + set up RB + setup MM + setup    SI MET f/b shotgun tech        Total Time        There Ex    Pt ed   RB- discussion about pain and pain/spasm/pain cycle; treatments     Progress Note        Recumbent bike        Active HS stretch w/ ankle pumps        Hip flexor stretch        LTR   10"x10 ea b/l  10"x10 ea b/l    Hip ER stretch   30"x3 ea b/l  30"x3 ea b/l    Piriformis Stretch        Pball lumbar flexion stretch 10"x10 fwd/10"x5 L/R 10"x10 fwd/10"x5 L/R 10"x10 fwd/10"x5 L/R 10"x10 ea    Open books to tolerance        Neuro-Re-ed    DLS: isometric abdominals- TA        DLS: isometric hip abduction w/ TA contraction        PNE        DLS: isometric hip adduction w/ TA contraction        DLS: fall outs w/ TA contraction        DLS: maureen w/ TA contraction    10x ea    DLS: S/l hip abduction        TB resisted sidestepping RTB 2 laps ea dir b/l  RTB 2 laps ea dir b/l       Shalonda walk outs   NV (may conside instead of tB resisted pending response to holding LV) NV (may conside instead of tB resisted pending response to holding LV)    TB resisted hip strengthening RTB 10x flex/abd b/l  RTB 15x flex/abd/ext      Lat step ups        DLS: clamshells        Hip hinge- seated 10x 10x      Ther Activity    STS 10x 10x      Step ups        Gait Training                    Modalities    CP PRN  10' 10' during supine ex deferred    TENS

## 2023-09-18 ENCOUNTER — OFFICE VISIT (OUTPATIENT)
Dept: PHYSICAL THERAPY | Facility: CLINIC | Age: 68
End: 2023-09-18
Payer: COMMERCIAL

## 2023-09-18 DIAGNOSIS — M47.816 SPONDYLOSIS WITHOUT MYELOPATHY OR RADICULOPATHY, LUMBAR REGION: Primary | ICD-10-CM

## 2023-09-18 DIAGNOSIS — M48.062 PSEUDOCLAUDICATION SYNDROME: ICD-10-CM

## 2023-09-18 PROCEDURE — 97112 NEUROMUSCULAR REEDUCATION: CPT | Performed by: PHYSICAL THERAPIST

## 2023-09-18 PROCEDURE — 97110 THERAPEUTIC EXERCISES: CPT | Performed by: PHYSICAL THERAPIST

## 2023-09-18 NOTE — PROGRESS NOTES
Daily Note     Today's date: 2023  Patient name: Jerry Rich  : 1955  MRN: [id number]  Referring provider: Brianna Avalos MD  Dx:   Encounter Diagnosis     ICD-10-CM    1. Spondylosis without myelopathy or radiculopathy, lumbar region  M47.816       2. Pseudoclaudication syndrome  M48.062                      Subjective: Pt felt good into Saturday and was even able to tolerate going out to dinner. However, he states that he felt more painful the following day after doing TENS in the AM instead of CP. Discussed that maybe he had intensity turned too high. Objective: See treatment diary below. Pt late - accommodated patient. Assessment: Held manual traction today as pt has appt w/ chiropractor tomorrow where he plans to have traction. Remainder of ex tolerated fairly well. Plan: Continue per plan of care. Progress as able. Daily Treatment Diary    EPOC: 23  Precautions: HTN- takes meds; Hx of R partial TKA  Co- Morbidities: HTN- takes meds;  Hx of R partial TKA    Manuals 9/8 9/11 9/13 9/15 9/18      Consider TPR to lumbosacral area JH-IASTM to b/l l/s psp RB- IASTM to b/l l/s psp and QL RB- IASTM to b/l l/s psp and QL MM- IASTM to b/l l/s psp and QL RB- IASTM to b/l l/s psp and QL      Manual l/s traction w/ belt JH + set up rB + set up RB + setup MM + setup HOLD- pt will have at chiropractor      SI MET f/b "[employer] tech           Total Time           There Ex       Pt ed   RB- discussion about pain and pain/spasm/pain cycle; treatments        Progress Note           Recumbent bike           Active HS stretch w/ ankle pumps           Hip flexor stretch           LTR   10"x10 ea b/l  10"x10 ea b/l       Hip ER stretch   30"x3 ea b/l  30"x3 ea b/l       Piriformis Stretch           Pball lumbar flexion stretch 10"x10 fwd/10"x5 L/R 10"x10 fwd/10"x5 L/R 10"x10 fwd/10"x5 L/R 10"x10 ea 10"x10 ea      Open books to tolerance           Neuro-Re-ed      DLS: isometric abdominals- TA DLS: isometric hip abduction w/ TA contraction           PNE           DLS: isometric hip adduction w/ TA contraction           DLS: fall outs w/ TA contraction           DLS: marches w/ TA contraction    10x ea       DLS: S/l hip abduction           TB resisted sidestepping RTB 2 laps ea dir b/l  RTB 2 laps ea dir b/l          Morse walk outs   NV (may conside instead of tB resisted pending response to holding LV) NV (may conside instead of tB resisted pending response to holding LV) 10# fwd 5 laps      TB resisted hip strengthening RTB 10x flex/abd b/l  RTB 15x flex/abd/ext         Lat step ups           DLS: clamshells           Hip hinge- seated 10x 10x   10x      Ther Activity      STS 10x 10x   5# 10x      Step ups           Gait Training                            Modalities      CP PRN  10' 10' during supine ex deferred 10'      TENS

## 2023-09-20 ENCOUNTER — OFFICE VISIT (OUTPATIENT)
Dept: PHYSICAL THERAPY | Facility: CLINIC | Age: 68
End: 2023-09-20
Payer: COMMERCIAL

## 2023-09-20 DIAGNOSIS — M48.062 PSEUDOCLAUDICATION SYNDROME: ICD-10-CM

## 2023-09-20 DIAGNOSIS — M47.816 SPONDYLOSIS WITHOUT MYELOPATHY OR RADICULOPATHY, LUMBAR REGION: Primary | ICD-10-CM

## 2023-09-20 PROCEDURE — 97110 THERAPEUTIC EXERCISES: CPT

## 2023-09-20 PROCEDURE — 97140 MANUAL THERAPY 1/> REGIONS: CPT

## 2023-09-20 NOTE — PROGRESS NOTES
Daily Note     Today's date: 2023  Patient name: Kika Roa  : 1955  MRN: [id number]  Referring provider: Caitie Sanchez MD  Dx:   Encounter Diagnosis     ICD-10-CM    1. Spondylosis without myelopathy or radiculopathy, lumbar region  M47.816       2. Pseudoclaudication syndrome  M48.062           Start Time: 1200          Subjective: Pt reports he went to the chiropractor yesterday where he did traction. He notes he feel she is standing up straighter today. Pt reports his pain levels have decreased since. He notes he wants to start going to the Chiropractor twice a week. Objective: See treatment diary below      Assessment: Pt tolerated exercises today better than in previous session. Tolerated treatment well. Patient would benefit from continued PT      Plan: Continue per plan of care. Daily Treatment Diary    EPOC: 23  Precautions: HTN- takes meds; Hx of R partial TKA  Co- Morbidities: HTN- takes meds;  Hx of R partial TKA    Manuals 9/8 9/11 9/13 9/15 9/18 9/20     Consider TPR to lumbosacral area JH-IASTM to b/l l/s psp RB- IASTM to b/l l/s psp and QL RB- IASTM to b/l l/s psp and QL MM- IASTM to b/l l/s psp and QL RB- IASTM to b/l l/s psp and QL AM-  IASTM to b/l l/s psp and QL     Manual l/s traction w/ belt JH + set up rB + set up RB + setup MM + setup HOLD- pt will have at chiropractor      SI MET f/b [employer] tech           Total Time           There Ex       Pt ed   RB- discussion about pain and pain/spasm/pain cycle; treatments        Progress Note           Recumbent bike           Active HS stretch w/ ankle pumps           Hip flexor stretch           LTR   10"x10 ea b/l  10"x10 ea b/l  10"x10      Hip ER stretch   30"x3 ea b/l  30"x3 ea b/l  30"x3 ea b/l      Piriformis Stretch           Pball lumbar flexion stretch 10"x10 fwd/10"x5 L/R 10"x10 fwd/10"x5 L/R 10"x10 fwd/10"x5 L/R 10"x10 ea 10"x10 ea 10"x10 ea      Open books to tolerance           Neuro-Re-ed      DLS: isometric abdominals- TA           DLS: isometric hip abduction w/ TA contraction           PNE JH          DLS: isometric hip adduction w/ TA contraction           DLS: fall outs w/ TA contraction           DLS: marches w/ TA contraction    10x ea       DLS: S/l hip abduction           TB resisted sidestepping RTB 2 laps ea dir b/l  RTB 2 laps ea dir b/l          Shalonda walk outs   NV (may conside instead of tB resisted pending response to holding LV) NV (may conside instead of tB resisted pending response to holding LV) 10# fwd 5 laps 10# fwd 5  Laps/ 3 lat     TB resisted hip strengthening RTB 10x flex/abd b/l  RTB 15x flex/abd/ext         Lat step ups           DLS: clamshells           Hip hinge- seated 10x 10x   10x      Ther Activity      STS 10x 10x   5# 10x      Step ups           Gait Training                            Modalities      CP PRN  10' 10' during supine ex deferred 10'      TENS

## 2023-09-22 ENCOUNTER — OFFICE VISIT (OUTPATIENT)
Dept: PHYSICAL THERAPY | Facility: CLINIC | Age: 68
End: 2023-09-22
Payer: COMMERCIAL

## 2023-09-22 DIAGNOSIS — M47.816 SPONDYLOSIS WITHOUT MYELOPATHY OR RADICULOPATHY, LUMBAR REGION: Primary | ICD-10-CM

## 2023-09-22 DIAGNOSIS — M48.062 PSEUDOCLAUDICATION SYNDROME: ICD-10-CM

## 2023-09-22 PROCEDURE — 97110 THERAPEUTIC EXERCISES: CPT

## 2023-09-22 PROCEDURE — 97112 NEUROMUSCULAR REEDUCATION: CPT

## 2023-09-22 PROCEDURE — 97140 MANUAL THERAPY 1/> REGIONS: CPT

## 2023-09-22 NOTE — PROGRESS NOTES
Daily Note     Today's date: 2023  Patient name: Danielle Cantu  : 1955  MRN: [id number]  Referring provider: Namrata Mark MD  Dx:   Encounter Diagnosis     ICD-10-CM    1. Spondylosis without myelopathy or radiculopathy, lumbar region  M47.816       2. Pseudoclaudication syndrome  M48.062           Start Time: 1100          Subjective: Pt reports he is feeling soreness today, he rates his pain 5/10 today. Objective: See treatment diary below      Assessment: Marches, hip hinges, and STS added back in this session per patient's tolerance. Challenge noted with 5# during STS. Cueing required with performance of bhavin walkouts. Pt able to tolerate 2 laps of lateral walkouts. Patient demonstrated fatigue post treatment and would benefit from continued PT      Plan: Continue per plan of care. Progress treatment as tolerated. Daily Treatment Diary    EPOC: 23  Precautions: HTN- takes meds; Hx of R partial TKA  Co- Morbidities: HTN- takes meds;  Hx of R partial TKA    Manuals 9/8 9/11 9/13 9/15 9/18 9/20 9/22    Consider TPR to lumbosacral area JH-IASTM to b/l l/s psp RB- IASTM to b/l l/s psp and QL RB- IASTM to b/l l/s psp and QL MM- IASTM to b/l l/s psp and QL RB- IASTM to b/l l/s psp and QL AM-  IASTM to b/l l/s psp and QL AM-  IASTM to b/l l/s psp and QL    Manual l/s traction w/ belt JH + set up rB + set up RB + setup MM + setup HOLD- pt will have at chiropractor      SI MET f/b [employer] tech           Total Time           There Ex       Pt ed   RB- discussion about pain and pain/spasm/pain cycle; treatments        Progress Note           Recumbent bike           Active HS stretch w/ ankle pumps           Hip flexor stretch           LTR   10"x10 ea b/l  10"x10 ea b/l  10"x10  10"x10     Hip ER stretch   30"x3 ea b/l  30"x3 ea b/l  30"x3 ea b/l  30"x3 ea b/l    Piriformis Stretch           Pball lumbar flexion stretch 10"x10 fwd/10"x5 L/R 10"x10 fwd/10"x5 L/R 10"x10 fwd/10"x5 L/R 10"x10 ea 10"x10 ea 10"x10 ea  10"x10 ea    Open books to tolerance           Neuro-Re-ed      DLS: isometric abdominals- TA           DLS: isometric hip abduction w/ TA contraction           PNE JH          DLS: isometric hip adduction w/ TA contraction           DLS: fall outs w/ TA contraction           DLS: marches w/ TA contraction    10x ea   10x ea    DLS: S/l hip abduction           TB resisted sidestepping RTB 2 laps ea dir b/l  RTB 2 laps ea dir b/l          Shalonda walk outs   NV (may conside instead of tB resisted pending response to holding LV) NV (may conside instead of tB resisted pending response to holding LV) 10# fwd 5 laps 10# fwd 5  Laps/ 3 lat 10# fwd 5 laps/ 2 laps ea lateral     TB resisted hip strengthening RTB 10x flex/abd b/l  RTB 15x flex/abd/ext         Lat step ups           DLS: clamshells           Hip hinge- seated 10x 10x   10x  10x    Ther Activity      STS 10x 10x   5# 10x  10x 5#    Step ups           Gait Training                            Modalities      CP PRN  10' 10' during supine ex deferred 10'  10'    TENS

## 2023-09-25 ENCOUNTER — OFFICE VISIT (OUTPATIENT)
Dept: PHYSICAL THERAPY | Facility: CLINIC | Age: 68
End: 2023-09-25
Payer: COMMERCIAL

## 2023-09-25 DIAGNOSIS — M48.062 PSEUDOCLAUDICATION SYNDROME: ICD-10-CM

## 2023-09-25 DIAGNOSIS — M47.816 SPONDYLOSIS WITHOUT MYELOPATHY OR RADICULOPATHY, LUMBAR REGION: Primary | ICD-10-CM

## 2023-09-25 PROCEDURE — 97110 THERAPEUTIC EXERCISES: CPT | Performed by: PHYSICAL THERAPIST

## 2023-09-25 PROCEDURE — 97140 MANUAL THERAPY 1/> REGIONS: CPT | Performed by: PHYSICAL THERAPIST

## 2023-09-25 PROCEDURE — 97112 NEUROMUSCULAR REEDUCATION: CPT | Performed by: PHYSICAL THERAPIST

## 2023-09-25 NOTE — PROGRESS NOTES
Daily Note     Today's date: 2023  Patient name: Camilo Webb  : 1955  MRN: [id number]  Referring provider: Cindy Fernandes MD  Dx:   Encounter Diagnosis     ICD-10-CM    1. Spondylosis without myelopathy or radiculopathy, lumbar region  M47.816       2. Pseudoclaudication syndrome  M48.062                      Subjective: Pt reports that pain has been better since seeing chiropractor who has started doing mechanical traction in conjunction w/ the PT , 4/10 today. He adds that he is walking straighter. Objective: See treatment diary below. Pt 15' late -accommodated pt      Assessment:Progressed ex this visit w/ mostly good tolerance. Pt did c/o l/s soreness w/ lat step ups and opted out of doing w RLE for fear of aggravating back pain. Discussed w/ pt that some soreness in l/s is ok, but that he should mostly feel glutes working. Plan: Continue per plan of care. Progress treatment as tolerated. Daily Treatment Diary    EPOC: 23  Precautions: HTN- takes meds; Hx of R partial TKA  Co- Morbidities: HTN- takes meds;  Hx of R partial TKA    Manuals 9/8 9/11 9/13 9/15 9/18 9/20 9/22 9/25   Consider TPR to lumbosacral area JH-IASTM to b/l l/s psp RB- IASTM to b/l l/s psp and QL RB- IASTM to b/l l/s psp and QL MM- IASTM to b/l l/s psp and QL RB- IASTM to b/l l/s psp and QL AM-  IASTM to b/l l/s psp and QL AM-  IASTM to b/l l/s psp and QL RB- IASTM to b/l l/s psp and QL   Manual l/s traction w/ belt JH + set up rB + set up RB + setup MM + setup HOLD- pt will have at chiropractor      SI MET f/b [employer] tech           Total Time           There Ex       Pt ed   RB- discussion about pain and pain/spasm/pain cycle; treatments     RB- discussion about progress   Progress Note           Recumbent bike           Active HS stretch w/ ankle pumps           Hip flexor stretch           LTR   10"x10 ea b/l  10"x10 ea b/l  10"x10  10"x10  10"x 5 ea b/l    Hip ER stretch   30"x3 ea b/l  30"x3 ea b/l  30"x3 ea b/l  30"x3 ea b/l 30" ea b/l    Piriformis Stretch           Pball lumbar flexion stretch 10"x10 fwd/10"x5 L/R 10"x10 fwd/10"x5 L/R 10"x10 fwd/10"x5 L/R 10"x10 ea 10"x10 ea 10"x10 ea  10"x10 ea 10"x10 ea flex/lat   Open books to tolerance           Neuro-Re-ed      DLS: isometric abdominals- TA           DLS: isometric hip abduction w/ TA contraction           PNE JH          DLS: isometric hip adduction w/ TA contraction           DLS: fall outs w/ TA contraction           DLS: marches w/ TA contraction    10x ea   10x ea    DLS: S/l hip abduction           TB resisted sidestepping RTB 2 laps ea dir b/l  RTB 2 laps ea dir b/l          Shalonda walk outs   NV (may conside instead of tB resisted pending response to holding LV) NV (may conside instead of tB resisted pending response to holding LV) 10# fwd 5 laps 10# fwd 5  Laps/ 3 lat 10# fwd 5 laps/ 2 laps ea lateral  10# fwd 5 laps/ 5# 2 laps ea lat   TB resisted hip strengthening RTB 10x flex/abd b/l  RTB 15x flex/abd/ext         Lat step ups        6" 10x L (add R nV)   DLS: clamshells           Hip hinge- seated 10x 10x   10x  10x    Ther Activity      STS 10x 10x   5# 10x  10x 5#    Step ups           Gait Training                            Modalities      CP PRN  10' 10' during supine ex deferred 10'  10' 10'   TENS

## 2023-09-27 ENCOUNTER — EVALUATION (OUTPATIENT)
Dept: PHYSICAL THERAPY | Facility: CLINIC | Age: 68
End: 2023-09-27
Payer: COMMERCIAL

## 2023-09-27 DIAGNOSIS — M48.062 PSEUDOCLAUDICATION SYNDROME: ICD-10-CM

## 2023-09-27 DIAGNOSIS — M47.816 SPONDYLOSIS WITHOUT MYELOPATHY OR RADICULOPATHY, LUMBAR REGION: Primary | ICD-10-CM

## 2023-09-27 PROCEDURE — 97110 THERAPEUTIC EXERCISES: CPT | Performed by: PHYSICAL THERAPIST

## 2023-09-27 PROCEDURE — 97140 MANUAL THERAPY 1/> REGIONS: CPT | Performed by: PHYSICAL THERAPIST

## 2023-09-27 NOTE — PROGRESS NOTES
PT Re-evaluation     Today's date: 2023  Patient name: Shelagh Olszewski  : 1955  MRN: [id number]  Referring provider: Rupali Dominique MD  Dx:   Encounter Diagnosis     ICD-10-CM    1. Spondylosis without myelopathy or radiculopathy, lumbar region  M47.816       2. Pseudoclaudication syndrome  M48.062                      Assessment  Assessment details: Shelagh Olszewski is a 74 y.o. male presenting as an outpatient to [address] [place] [address]. [facility] w/ c/o lumbosacral pain post surgery. Since previous progress note, pt reports improved pain level and  improved overall activity tolerance/function and continues to maintain ROM WFL and strength 5/5. He also has demonstrated improved hamstring flexibility b/l. Despite progress, he continues to remain limited from max function and has the most difficulty w/ driving tolerance and getting OOB in the AM. He also has not yet been able to return to [employer] and/or [employer] . Pt will continue to benefit from skilled PT services in order to max function to allow pt to achieve goals in PT. Thank you. Impairments:activity intolerance, pain with function  Understanding of Dx/Px/POC: good   Prognosis: good    Goals  ST. Pain decreased by 25% in 4-6 weeks. -PROGRESSING   2. ROM increased by 25% in 4-6 weeks. - MET  3. Strength increased by 1/2 to 1 muscle grade in all deficient muscle groups in 4-6 weeks. -  MET    LT. Decrease pain to 1-2/10 at worst by d/c. - PROGRESSING  2. Increase ROM to [facility] for all deficient movements by d/c. MET  3. Strength increased to 5 for all deficient muscle groups by d/c. MET  4. IADL performance increased to max function by d/c. PROGRESSING  5. Recreational performance increased to max function by d/c. PROGRESSING  6. Ambulation/stair climbing improved to max level of function by d/c.  PROGRESSING    Plan  Planned modality interventions: cryotherapy, TENS and thermotherapy: hydrocollator packs  Other planned modality interventions: other modalities PRN  Planned therapy interventions: abdominal trunk stabilization, ADL retraining, IADL retraining, balance, flexibility, functional ROM exercises, graded exercise, home exercise program, manual therapy, joint mobilization, neuromuscular re-education, patient education, postural training, strengthening, therapeutic exercise, therapeutic activities, transfer training and work reintegration  Other planned therapy interventions: other interventions PRN  Frequency: 2-3x/week. Duration in weeks: 4  Plan of Care beginning date: 9/27/23  Plan of Care expiration date: 10/25/23  Treatment plan discussed with: patient        Subjective Evaluation    History of Present Illness  CURRENT LEVEL (9/27/23)  Pt feels that he is 40% recovered at this time, but states that this is a significant improvement from previous progress note. He feels that he has finally "made a turn" over the past 2 weeks and attributes this to a combination of skilled PT tx and mechanical traction which is being done by his chiropractor. He is now able to carry/lift more - states he can carry a case of water and do more around the home in general. He also feels that he is walking straighter and is able to perform a bridge w/ much less pain vs previously. bridges. 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 He is planning to have TPR injections tomorrow as this helped him significantly in the past.     PREVIOUS LEVEL (8/30/23)  Pt reports that he was doing well post trigger point injection approx 2 weeks ago, but felt that relief has "worn off".  Despite this feeling that pain has returned to a higher level again, pt reports being able to walk longer periods of time since last progress note (20-25 minutes vs 10-15 minutes previously). Although he was more painful after, he was able to do 5 loads of laundry up/down stairs while away at his home in [facility] this weekend. He continues to have difficulty traveling long distances in the car, but can tolerate the ride better vs previously. He is concerned of continued b/l LE partial numbness after driving long distances. Instructed pt to relay this information to surgeon. PREVIOUS LEVEL (7/31/23)  With the exception of this past week, pt reports that pain levels had slowly been improving w/ skilled PT tx. However, Mondays continued to be the most painful for pt as it was 5 days since his LV. Pt was able to tolerate driving down to [place] (approx 2 hours) w/ rest breaks and intermittently needing to lie down in back of truck while wife drove. He reports doing well immediately after the drive down and attributes some of that to having skilled PT tx in the AM prior to driving down. He has been responding well to manuals in conjunction w/ exercise. However, he reports developing a significant amount of pain after standing all day at 1 of the 2 funerals he needed to attend which lasted into today. Pt admits that he had limited time for HEP during this time as well. Pt does not have stairs in [place] home, but was able to negotiate stairs in reciprocating fashion at home in [facility] this past week. Prior to this week, pt reports that he has been compliant w/ HEP. INITIAL LEVEL (6/28/23)  Mechanism of injury: Pt reports to IE w/ c/o lumbosacral pain which began approximately 2 years ago possibly due to compensation for R knee w/ progressive worsening. Pt w/ hx of L4-L5 facet injections approx 1.5 years ago which lasted him approx 3 weeks. He had another set of injections, but pain returned. Pt had rhizotomy for R side done approx 1 year ago which helped LE symptoms significantly .  Pt had L sided rhizotomy 4-5 weeks later. Pt had MLD procedure done on L4-L5 in early April. Pt reports that he was restricted to < 1 hour driving initially. He ended up sitting in car for 4 hours due to poor weather a couple weeks post surgery and felt that his pain returned significantly, but more into sacral area. Pt underwent another MILD procedure done on  at L5-S1 - had initial significant improvement in symptoms, but pain and parasthesias returned since then. Pt reports intermittent parasthesias into anterior thighs. He adds that he feels that his legs may give out at times. He does not use AD. Otherwise, pain typically localized to lumbosacral area at this time. Pain  Current pain rating: 3-4/10  Best pain level: 3/10 (since starting mechanical traction and ointment)  At worst pain ratin/10  Location: Lumbosacral area  Alleviating factors: lidocaine patches, Tramadol, trigger point injections, analgesic ointment  Exacerbated by: when getting OOB in the AM, walking > 20-25 minutes, intermittently sleeping at night, stair climbing (reciprocating fashion), heavier lifting, functional squat, driving for any distance    Social Support  Stairs in house: yes (Only in [facility])   Patient lives at: 1 story home in the [facility]; 2 story home in [facility] (in the process of moving from [facility] to [facility])  Lives with: spouse    Employment status: not working (retired- pt was an  for years)  Patient Goals  Patient goals for therapy: increased strength and decreased pain- Progressing      Objective     Active Range of Motion     Lumbar   Flexion: Active lumbar flexion: *lumbosacral soreness. Restriction level: WFL  Extension: Active lumbar extension: *lumbosacral pain.   Restriction level: WFL  Left lateral flexion:  WFL  Right lateral flexion:  WFL  Left rotation:  WFL  Right rotation:  [facility]    Strength/Myotome Testing     Left Hip   Planes of Motion   Flexion: 5 (*pain)    Right Hip Planes of Motion   Flexion: 5 (*pain)    Left Knee   Flexion: 5  Extension: 5    Right Knee   Flexion: 5, *min back pain  Extension: 5    Left Ankle/Foot   Dorsiflexion: 5  Plantar flexion: 5  Great toe extension: 5    Right Ankle/Foot   Dorsiflexion: 5  Plantar flexion: 5  Great toe extension: 5    Tests     Additional Tests Details  Observation/Posture: Pt sits w/ PPT and fwd, rounded shoulders and fwd head    Palpation: Hypertonicity/tenderness w/ palpation to b/l l/s and lower t/s psp, b/l QL, b/l TFL    Special Tests (L/R):   SLR: negative/negative  Crossed SLR: negative/negative    HS Length (assessed via popliteal angle method- L/R): 15 deg/ 15 deg    SIJ:   Distraction: negative  Supine --> sit assessment/LLD: negative    Supine Marches: Fair core strength    Daily Treatment Diary    EPOC: 9/27/23  Precautions: HTN- takes meds; Hx of R partial TKA  Co- Morbidities: HTN- takes meds;  Hx of R partial TKA    Manuals 9/8 9/11 9/13 9/15 9/18 9/20 9/22 9/25 9/27     Consider TPR to lumbosacral area JH-IASTM to b/l l/s psp RB- IASTM to b/l l/s psp and QL RB- IASTM to b/l l/s psp and QL MM- IASTM to b/l l/s psp and QL RB- IASTM to b/l l/s psp and QL AM-  IASTM to b/l l/s psp and QL AM-  IASTM to b/l l/s psp and QL RB- IASTM to b/l l/s psp and QL RB- IASTM to b/l l/s psp and QL     Manual l/s traction w/ belt JH + set up rB + set up RB + setup MM + setup HOLD- pt will have at chiropractor         SI MET f/b [employer] tech         No LLD     Total Time              There Ex          Pt ed   RB- discussion about pain and pain/spasm/pain cycle; treatments     RB- discussion about progress      Progress Note         RB + discussoin about progress, TPR, going forward     Recumbent bike              Active HS stretch w/ ankle pumps              Hip flexor stretch              LTR   10"x10 ea b/l  10"x10 ea b/l  10"x10  10"x10  10"x 5 ea b/l  10"x5 ea b/l      Hip ER stretch   30"x3 ea b/l  30"x3 ea b/l  30"x3 ea b/l  30"x3 ea b/l 30" ea b/l  30"x ea b/l      Piriformis Stretch         10"x10 ea b/l     Pball lumbar flexion stretch 10"x10 fwd/10"x5 L/R 10"x10 fwd/10"x5 L/R 10"x10 fwd/10"x5 L/R 10"x10 ea 10"x10 ea 10"x10 ea  10"x10 ea 10"x10 ea flex/lat 10"x10 ea flex/lat     Open books to tolerance              Neuro-Re-ed         DLS: isometric abdominals- TA              DLS: isometric hip abduction w/ TA contraction              PNE JH             DLS: isometric hip adduction w/ TA contraction              DLS: fall outs w/ TA contraction              DLS: marches w/ TA contraction    10x ea   10x ea       DLS: S/l hip abduction              TB resisted sidestepping RTB 2 laps ea dir b/l  RTB 2 laps ea dir b/l             [place] walk outs   NV (may conside instead of tB resisted pending response to holding LV) NV (may conside instead of tB resisted pending response to holding LV) 10# fwd 5 laps 10# fwd 5  Laps/ 3 lat 10# fwd 5 laps/ 2 laps ea lateral  10# fwd 5 laps/ 5# 2 laps ea lat      TB resisted hip strengthening RTB 10x flex/abd b/l  RTB 15x flex/abd/ext            Lat step ups        6" 10x L (add R nV)      DLS: clamshells              Hip hinge- seated 10x 10x   10x  10x       Ther Activity         STS 10x 10x   5# 10x  10x 5#       Step ups              Gait Training                                     Modalities         CP PRN  10' 10' during supine ex deferred 8'  10' 10' 10'       TENS

## 2023-09-27 NOTE — LETTER
2023    Walt Gaona MD  [address] [facility] [address] [address]    Patient: Chrissy Sneed   YOB: 1955   Date of Visit: 2023     Encounter Diagnosis     ICD-10-CM    1. Spondylosis without myelopathy or radiculopathy, lumbar region  M47.816       2. Pseudoclaudication syndrome  R75.919           Dear Dr. Hartman Expose:    Thank you for your recent referral of Alvarez Holliday. Please review the attached evaluation summary from Alvarez's recent visit. Please verify that you agree with the plan of care by signing the attached order. If you have any questions or concerns, please do not hesitate to call. I sincerely appreciate the opportunity to share in the care of one of your patients and hope to have another opportunity to work with you in the near future. Sincerely,    Darren Doll, PT      Referring Provider:      I certify that I have read the below Plan of Care and certify the need for these services furnished under this plan of treatment while under my care. Walt Gaona MD  [address] [facility] [address] [address]  Via Fax: [phone]          PT Re-evaluation     Today's date: 2023  Patient name: Chrissy Sneed  : 1955  MRN: [id number]  Referring provider: Walt Gaona MD  Dx:   Encounter Diagnosis     ICD-10-CM    1. Spondylosis without myelopathy or radiculopathy, lumbar region  M47.816       2. Pseudoclaudication syndrome  M48.062                      Assessment  Assessment details: Chrissy Sneed is a 74 y.o. male presenting as an outpatient to [facility]. [facility] w/ c/o lumbosacral pain post surgery. Since previous progress note, pt reports improved pain level and  improved overall activity tolerance/function and continues to maintain ROM WFL and strength /. He also has demonstrated improved hamstring flexibility b/l.  Despite progress, he continues to remain limited from max function and has the most difficulty w/ driving tolerance and getting OOB in the AM. He also has not yet been able to return to [employer] and/or [employer] . Pt will continue to benefit from skilled PT services in order to max function to allow pt to achieve goals in PT. Thank you. Impairments:activity intolerance, pain with function  Understanding of Dx/Px/POC: good   Prognosis: good    Goals  ST. Pain decreased by 25% in 4-6 weeks. -PROGRESSING   2. ROM increased by 25% in 4-6 weeks. - MET  3. Strength increased by 1/2 to 1 muscle grade in all deficient muscle groups in 4-6 weeks. -  MET    LT. Decrease pain to 1-2/10 at worst by d/c. - PROGRESSING  2. Increase ROM to [facility] for all deficient movements by d/c. MET  3. Strength increased to 5 for all deficient muscle groups by d/c. MET  4. IADL performance increased to max function by d/c. PROGRESSING  5. Recreational performance increased to max function by d/c. PROGRESSING  6. Ambulation/stair climbing improved to max level of function by d/c. PROGRESSING    Plan  Planned modality interventions: cryotherapy, TENS and thermotherapy: hydrocollator packs  Other planned modality interventions: other modalities PRN  Planned therapy interventions: abdominal trunk stabilization, ADL retraining, IADL retraining, balance, flexibility, functional ROM exercises, graded exercise, home exercise program, manual therapy, joint mobilization, neuromuscular re-education, patient education, postural training, strengthening, therapeutic exercise, therapeutic activities, transfer training and work reintegration  Other planned therapy interventions: other interventions PRN  Frequency: 2-3x/week. Duration in weeks: 4  Plan of Care beginning date: 23  Plan of Care expiration date: 10/25/23  Treatment plan discussed with: patient        Subjective Evaluation    History of Present Illness  CURRENT LEVEL (23)  Pt feels that he is 40% recovered at this time, but states that this is a significant improvement from previous progress note.  He feels that he has finally "made a turn" over the past 2 weeks and attributes this to a combination of skilled PT tx and mechanical traction which is being done by his chiropractor. He is now able to carry/lift more - states he can carry a case of water and do more around the home in general. He also feels that he is walking straighter and is able to perform a bridge w/ much less pain vs previously. bridges. 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 He is planning to have TPR injections tomorrow as this helped him significantly in the past.     PREVIOUS LEVEL (8/30/23)  Pt reports that he was doing well post trigger point injection approx 2 weeks ago, but felt that relief has "worn off". Despite this feeling that pain has returned to a higher level again, pt reports being able to walk longer periods of time since last progress note (20-25 minutes vs 10-15 minutes previously). Although he was more painful after, he was able to do 5 loads of laundry up/down stairs while away at his home in [facility] this weekend. He continues to have difficulty traveling long distances in the car, but can tolerate the ride better vs previously. He is concerned of continued b/l LE partial numbness after driving long distances. Instructed pt to relay this information to surgeon. PREVIOUS LEVEL (7/31/23)  With the exception of this past week, pt reports that pain levels had slowly been improving w/ skilled PT tx. However, Mondays continued to be the most painful for pt as it was 5 days since his LV.     Pt was able to tolerate driving down to [place] (approx 2 hours) w/ rest breaks and intermittently needing to lie down in back of truck while wife drove. He reports doing well immediately after the drive down and attributes some of that to having skilled PT tx in the AM prior to driving down. He has been responding well to manuals in conjunction w/ exercise. However, he reports developing a significant amount of pain after standing all day at 1 of the 2 funerals he needed to attend which lasted into today. Pt admits that he had limited time for HEP during this time as well. Pt does not have stairs in [place] home, but was able to negotiate stairs in reciprocating fashion at home in [facility] this past week. Prior to this week, pt reports that he has been compliant w/ HEP. INITIAL LEVEL (6/28/23)  Mechanism of injury: Pt reports to IE w/ c/o lumbosacral pain which began approximately 2 years ago possibly due to compensation for R knee w/ progressive worsening. Pt w/ hx of L4-L5 facet injections approx 1.5 years ago which lasted him approx 3 weeks. He had another set of injections, but pain returned. Pt had rhizotomy for R side done approx 1 year ago which helped LE symptoms significantly . Pt had L sided rhizotomy 4-5 weeks later. Pt had MLD procedure done on L4-L5 in early April. Pt reports that he was restricted to < 1 hour driving initially. He ended up sitting in car for 4 hours due to poor weather a couple weeks post surgery and felt that his pain returned significantly, but more into sacral area. Pt underwent another MILD procedure done on 6/5 at L5-S1 - had initial significant improvement in symptoms, but pain and parasthesias returned since then. Pt reports intermittent parasthesias into anterior thighs. He adds that he feels that his legs may give out at times. He does not use AD. Otherwise, pain typically localized to lumbosacral area at this time.      Pain  Current pain rating: 3-4/10  Best pain level: 3/10 (since starting mechanical traction and ointment)  At worst pain rating: 6/10  Location: Lumbosacral area  Alleviating factors: lidocaine patches, Tramadol, trigger point injections, analgesic ointment  Exacerbated by: when getting OOB in the AM, walking > 20-25 minutes, intermittently sleeping at night, stair climbing (reciprocating fashion), heavier lifting, functional squat, driving for any distance    Social Support  Stairs in house: yes (Only in [address] [place] [address])   Patient lives at: 1 story home in the [place]; 2 story home in [facility] (in the process of moving from [facility] to [place])  Lives with: spouse    Employment status: not working (retired- pt was an  for years)  Patient Goals  Patient goals for therapy: increased strength and decreased pain- Progressing      Objective     Active Range of Motion     Lumbar   Flexion: Active lumbar flexion: *lumbosacral soreness. Restriction level: WFL  Extension: Active lumbar extension: *lumbosacral pain.   Restriction level: WFL  Left lateral flexion:  WFL  Right lateral flexion:  WFL  Left rotation:  [facility]  Right rotation:  [facility]    Strength/Myotome Testing     Left Hip   Planes of Motion   Flexion: 5 (*pain)    Right Hip   Planes of Motion   Flexion: 5 (*pain)    Left Knee   Flexion: 5  Extension: 5    Right Knee   Flexion: 5, *min back pain  Extension: 5    Left Ankle/Foot   Dorsiflexion: 5  Plantar flexion: 5  Great toe extension: 5    Right Ankle/Foot   Dorsiflexion: 5  Plantar flexion: 5  Great toe extension: 5    Tests     Additional Tests Details  Observation/Posture: Pt sits w/ PPT and fwd, rounded shoulders and fwd head    Palpation: Hypertonicity/tenderness w/ palpation to b/l l/s and lower t/s psp, b/l QL, b/l TFL    Special Tests (L/R):   SLR: negative/negative  Crossed SLR: negative/negative    HS Length (assessed via popliteal angle method- L/R): 15 deg/ 15 deg    SIJ:   Distraction: negative  Supine --> sit assessment/LLD: negative    Supine Marches: Fair core strength    Daily Treatment Diary    EPOC: 9/27/23  Precautions: HTN- takes meds; Hx of R partial TKA  Co- Morbidities: HTN- takes meds;  Hx of R partial TKA    Manuals 9/8 9/11 9/13 9/15 9/18 9/20 9/22 9/25 9/27     Consider TPR to lumbosacral area JH-IASTM to b/l l/s psp RB- IASTM to b/l l/s psp and QL RB- IASTM to b/l l/s psp and QL MM- IASTM to b/l l/s psp and QL RB- IASTM to b/l l/s psp and QL AM-  IASTM to b/l l/s psp and QL AM-  IASTM to b/l l/s psp and QL RB- IASTM to b/l l/s psp and QL RB- IASTM to b/l l/s psp and QL     Manual l/s traction w/ belt JH + set up rB + set up RB + setup MM + setup HOLD- pt will have at chiropractor         SI MET f/b [employer] tech         No LLD     Total Time              There Ex          Pt ed   RB- discussion about pain and pain/spasm/pain cycle; treatments     RB- discussion about progress      Progress Note         RB + discussoin about progress, TPR, going forward     Recumbent bike              Active HS stretch w/ ankle pumps              Hip flexor stretch              LTR   10"x10 ea b/l  10"x10 ea b/l  10"x10  10"x10  10"x 5 ea b/l  10"x5 ea b/l      Hip ER stretch   30"x3 ea b/l  30"x3 ea b/l  30"x3 ea b/l  30"x3 ea b/l 30" ea b/l  30"x ea b/l      Piriformis Stretch         10"x10 ea b/l     Pball lumbar flexion stretch 10"x10 fwd/10"x5 L/R 10"x10 fwd/10"x5 L/R 10"x10 fwd/10"x5 L/R 10"x10 ea 10"x10 ea 10"x10 ea  10"x10 ea 10"x10 ea flex/lat 10"x10 ea flex/lat     Open books to tolerance              Neuro-Re-ed         DLS: isometric abdominals- TA              DLS: isometric hip abduction w/ TA contraction              PNE JH             DLS: isometric hip adduction w/ TA contraction              DLS: fall outs w/ TA contraction              DLS: marches w/ TA contraction    10x ea   10x ea       DLS: S/l hip abduction              TB resisted sidestepping RTB 2 laps ea dir b/l  RTB 2 laps ea dir b/l             [place] walk outs   NV (may conside instead of tB resisted pending response to holding LV) NV (may conside instead of tB resisted pending response to holding LV) 10# fwd 5 laps 10# fwd 5  Laps/ 3 lat 10# fwd 5 laps/ 2 laps ea lateral  10# fwd 5 laps/ 5# 2 laps ea lat      TB resisted hip strengthening RTB 10x flex/abd b/l  RTB 15x flex/abd/ext            Lat step ups        6" 10x L (add R nV)      DLS: clamshells              Hip hinge- seated 10x 10x   10x  10x       Ther Activity         STS 10x 10x   5# 10x  10x 5#       Step ups              Gait Training                                     Modalities         CP PRN  10' 10' during supine ex deferred 8'  10' 10' 10'       TENS

## 2023-09-29 ENCOUNTER — APPOINTMENT (OUTPATIENT)
Dept: PHYSICAL THERAPY | Facility: CLINIC | Age: 68
End: 2023-09-29
Payer: COMMERCIAL

## 2023-10-02 ENCOUNTER — APPOINTMENT (OUTPATIENT)
Dept: PHYSICAL THERAPY | Facility: CLINIC | Age: 68
End: 2023-10-02
Payer: COMMERCIAL

## 2023-10-04 ENCOUNTER — OFFICE VISIT (OUTPATIENT)
Dept: PHYSICAL THERAPY | Facility: CLINIC | Age: 68
End: 2023-10-04
Payer: COMMERCIAL

## 2023-10-04 DIAGNOSIS — M47.816 SPONDYLOSIS WITHOUT MYELOPATHY OR RADICULOPATHY, LUMBAR REGION: Primary | ICD-10-CM

## 2023-10-04 PROCEDURE — 97110 THERAPEUTIC EXERCISES: CPT

## 2023-10-04 NOTE — PROGRESS NOTES
Daily Note     Today's date: 10/4/2023  Patient name: Collin Beverly  : 1955  MRN: [id number]  Referring provider: Brianna Bo MD  Dx:   Encounter Diagnosis     ICD-10-CM    1. Spondylosis without myelopathy or radiculopathy, lumbar region  M47.816                      Subjective: pt reports he went to the pain doctor on Friday. Reports that he burned the nerves again on the R side and he returns in 2 weeks to burn the L side. Stated that he had L3, L4, and L5 nerves were burned on R side. Objective: See treatment diary below      Assessment: Tolerated treatment well. Patient would benefit from continued PT. Withheld manual therapies today due to patient still experiencing some soreness locally from the nerves being burnt. Focused on gentle stretches and core strengthening w/ good tolerance. No reported increased pain t/o session. Plan: Continue per plan of care. Daily Treatment Diary    EPOC: 10/25/23  Precautions: HTN- takes meds; Hx of R partial TKA  Co- Morbidities: HTN- takes meds;  Hx of R partial TKA    Manuals 10/4   9/15 9/18 9/20 9/22 9/25 9/27     Consider TPR to lumbosacral area Held today   MM- IASTM to b/l l/s psp and QL RB- IASTM to b/l l/s psp and QL AM-  IASTM to b/l l/s psp and QL AM-  IASTM to b/l l/s psp and QL RB- IASTM to b/l l/s psp and QL RB- IASTM to b/l l/s psp and QL     Manual l/s traction w/ belt    MM + setup HOLD- pt will have at chiropractor         SI MET f/b [employer] tech         No LLD     Total Time              There Ex          Pt ed [facility]       RB- discussion about progress      Progress Note         RB + discussoin about progress, TPR, going forward     Recumbent bike              Active HS stretch w/ ankle pumps              Hip flexor stretch              LTR 10" 10x ea   10"x10 ea b/l  10"x10  10"x10  10"x 5 ea b/l  10"x5 ea b/l      Hip ER stretch 30"x3 ea b/l   30"x3 ea b/l  30"x3 ea b/l  30"x3 ea b/l 30" ea b/l  30"x ea b/l      Piriformis Stretch 30"x3 ea b/l        10"x10 ea b/l     Pball lumbar flexion stretch 10"x10 ea   10"x10 ea 10"x10 ea 10"x10 ea  10"x10 ea 10"x10 ea flex/lat 10"x10 ea flex/lat     Open books to tolerance              Neuro-Re-ed         DLS: isometric abdominals- TA              DLS: isometric hip abduction w/ TA contraction              PNE              DLS: isometric hip adduction w/ TA contraction              DLS: fall outs w/ TA contraction              DLS: marches w/ TA contraction 10x   10x ea   10x ea       DLS: S/l hip abduction              TB resisted sidestepping              [place] walk outs    NV (may conside instead of tB resisted pending response to holding LV) 10# fwd 5 laps 10# fwd 5  Laps/ 3 lat 10# fwd 5 laps/ 2 laps ea lateral  10# fwd 5 laps/ 5# 2 laps ea lat      TB resisted hip strengthening              Lat step ups        6" 10x L (add R nV)      DLS: clamshells              Hip hinge- seated     10x  10x       Ther Activity         STS     5# 10x  10x 5#       Step ups              Gait Training                                     Modalities         CP PRN    deferred 8'  10' 10' 10'       TENS

## 2023-10-06 ENCOUNTER — OFFICE VISIT (OUTPATIENT)
Dept: PHYSICAL THERAPY | Facility: CLINIC | Age: 68
End: 2023-10-06
Payer: COMMERCIAL

## 2023-10-06 DIAGNOSIS — M47.816 SPONDYLOSIS WITHOUT MYELOPATHY OR RADICULOPATHY, LUMBAR REGION: Primary | ICD-10-CM

## 2023-10-06 PROCEDURE — 97112 NEUROMUSCULAR REEDUCATION: CPT

## 2023-10-06 PROCEDURE — 97110 THERAPEUTIC EXERCISES: CPT

## 2023-10-06 NOTE — PROGRESS NOTES
Daily Note     Today's date: 10/6/2023  Patient name: Amisha Cleaning  : 1955  MRN: [id number]  Referring provider: Luis A Izaguirre MD  Dx:   Encounter Diagnosis     ICD-10-CM    1. Spondylosis without myelopathy or radiculopathy, lumbar region  M47.816                      Subjective: pt reports feeling good today. Objective: See treatment diary below      Assessment: Tolerated treatment well. Patient would benefit from continued PT. Able to progress into strengthening this session w/ no major c/o of pain. Cueing for good core engagement t/o session. Plan: Continue per plan of care. Daily Treatment Diary    EPOC: 10/25/23  Precautions: HTN- takes meds; Hx of R partial TKA  Co- Morbidities: HTN- takes meds;  Hx of R partial TKA    Manuals 10/4 10/6            Consider TPR to lumbosacral area Held today Held today            Manual l/s traction w/ belt              SI MET f/b shotgun tech              Total Time              There Ex          Pt ed [facility]             Progress Note              Recumbent bike              Active HS stretch w/ ankle pumps              Hip flexor stretch              LTR 10" 10x ea 10" 10x ea            Hip ER stretch 30"x3 ea b/l 30"x3 ea b/l            Piriformis Stretch 30"x3 ea b/l 30"x3 ea b/l            Pball lumbar flexion stretch 10"x10 ea 10"x10 ea            Open books to tolerance              Neuro-Re-ed         DLS: isometric abdominals- TA              DLS: isometric hip abduction w/ TA contraction              PNE              DLS: isometric hip adduction w/ TA contraction              DLS: fall outs w/ TA contraction              DLS: marches w/ TA contraction 10x             DLS: S/l hip abduction              TB resisted sidestepping              Shalonda walk outs  10# fwd 5x  5# lat 3x all w/ belt            TB resisted hip strengthening              Lat step ups  6" 10x            DLS: clamshells              Hip hinge- seated              Ther Activity STS              Step ups  6" 10x            Gait Training                                     Modalities         CP PRN              TENS

## 2023-10-09 ENCOUNTER — OFFICE VISIT (OUTPATIENT)
Dept: PHYSICAL THERAPY | Facility: CLINIC | Age: 68
End: 2023-10-09
Payer: COMMERCIAL

## 2023-10-09 DIAGNOSIS — M47.816 SPONDYLOSIS WITHOUT MYELOPATHY OR RADICULOPATHY, LUMBAR REGION: Primary | ICD-10-CM

## 2023-10-09 DIAGNOSIS — M48.062 PSEUDOCLAUDICATION SYNDROME: ICD-10-CM

## 2023-10-09 PROCEDURE — 97530 THERAPEUTIC ACTIVITIES: CPT

## 2023-10-09 PROCEDURE — 97110 THERAPEUTIC EXERCISES: CPT

## 2023-10-09 PROCEDURE — 97112 NEUROMUSCULAR REEDUCATION: CPT

## 2023-10-09 NOTE — PROGRESS NOTES
Daily Note     Today's date: 10/9/2023  Patient name: Matteo Doss   : 1955  MRN: [id number]  Referring provider: Jerel Gallegos MD  Dx:   Encounter Diagnosis     ICD-10-CM    1. Spondylosis without myelopathy or radiculopathy, lumbar region  M47.816       2. Pseudoclaudication syndrome  M48.062                      Subjective: pt reports 3/10 on R side of l/s and 6/10 on L side. Objective: See treatment diary below      Assessment: Tolerated treatment well. Patient would benefit from continued PT. Progressed functional strengthening w/ cueing on form and technique. Educated pt that he can be sore post session due to adding new exercises. Plan: Continue per plan of care. Daily Treatment Diary    EPOC: 10/25/23  Precautions: HTN- takes meds; Hx of R partial TKA  Co- Morbidities: HTN- takes meds;  Hx of R partial TKA    Manuals 10/4 10/6 10/9           Consider TPR to lumbosacral area Held today Held today            Manual l/s traction w/ belt              SI MET f/b [employer] tech              Total Time              There Ex          Pt ed [facility]             Progress Note              Recumbent bike              Active HS stretch w/ ankle pumps              Hip flexor stretch              LTR 10" 10x ea 10" 10x ea 10"x10 ea           Hip ER stretch 30"x3 ea b/l 30"x3 ea b/l 30"x3 ea b/l           Piriformis Stretch 30"x3 ea b/l 30"x3 ea b/l 30"x3 ea b/l           Pball lumbar flexion stretch 10"x10 ea 10"x10 ea 10"x10 ea           Open books to tolerance              Neuro-Re-ed         DLS: isometric abdominals- TA              DLS: isometric hip abduction w/ TA contraction              PNE              DLS: isometric hip adduction w/ TA contraction              DLS: fall outs w/ TA contraction              DLS: marches w/ TA contraction 10x             DLS: S/l hip abduction              TB resisted sidestepping              Shalonda walk outs  10# fwd 5x  5# lat 3x all w/ belt 10# fwd 5x; 7# lat 5x ea b/l belt           TB resisted hip strengthening              Lat step ups  6" 10x 6" x10           DLS: clamshells              Hip hinge- seated   Standing 10x + deadlift against wall            Ther Activity         STS   Low mat 10x           Step ups  6" 10x 6" 10x           Gait Training                                     Modalities         CP PRN              TENS

## 2023-10-11 ENCOUNTER — OFFICE VISIT (OUTPATIENT)
Dept: PHYSICAL THERAPY | Facility: CLINIC | Age: 68
End: 2023-10-11
Payer: COMMERCIAL

## 2023-10-11 DIAGNOSIS — M48.062 PSEUDOCLAUDICATION SYNDROME: ICD-10-CM

## 2023-10-11 DIAGNOSIS — M47.816 SPONDYLOSIS WITHOUT MYELOPATHY OR RADICULOPATHY, LUMBAR REGION: Primary | ICD-10-CM

## 2023-10-11 PROCEDURE — 97530 THERAPEUTIC ACTIVITIES: CPT | Performed by: PHYSICAL THERAPIST

## 2023-10-11 PROCEDURE — 97110 THERAPEUTIC EXERCISES: CPT | Performed by: PHYSICAL THERAPIST

## 2023-10-11 PROCEDURE — 97112 NEUROMUSCULAR REEDUCATION: CPT | Performed by: PHYSICAL THERAPIST

## 2023-10-11 NOTE — PROGRESS NOTES
Daily Note     Today's date: 10/11/2023  Patient name: Del Matamoros   : 1955  MRN: [id number]  Referring provider: Wandy Smith MD  Dx:   Encounter Diagnosis     ICD-10-CM    1. Spondylosis without myelopathy or radiculopathy, lumbar region  M47.816       2. Pseudoclaudication syndrome  M48.062                      Subjective: pt continues to report feeling good and relatively the same as at LV. Objective: See treatment diary below      Assessment: Pt had some discomfort in lumbosacral area w/ attempted dead lift/hip hinge at wall and had difficulty engaging abdominals/glutes. Remainder of ex tolerated well. Progress as able NV. Plan: Continue per plan of care. Daily Treatment Diary    EPOC: 10/25/23  Precautions: HTN- takes meds; Hx of R partial TKA  Co- Morbidities: HTN- takes meds;  Hx of R partial TKA    Manuals 10/4 10/6 10/9 10/11          Consider TPR to lumbosacral area Held today Held today            Manual l/s traction w/ belt              SI MET f/b [employer] tech              Total Time              There Ex          Pt ed [facility]             Progress Note              Recumbent bike              Active HS stretch w/ ankle pumps              Hip flexor stretch              LTR 10" 10x ea 10" 10x ea 10"x10 ea 10"x10          Hip ER stretch 30"x3 ea b/l 30"x3 ea b/l 30"x3 ea b/l 30"x3          Piriformis Stretch 30"x3 ea b/l 30"x3 ea b/l 30"x3 ea b/l 30"x3          Pball lumbar flexion stretch 10"x10 ea 10"x10 ea 10"x10 ea 10"x10          Open books to tolerance              Neuro-Re-ed         DLS: isometric abdominals- TA              DLS: isometric hip abduction w/ TA contraction              PNE              DLS: isometric hip adduction w/ TA contraction              DLS: fall outs w/ TA contraction              DLS: marches w/ TA contraction 10x             DLS: S/l hip abduction              TB resisted sidestepping              Shalonda walk outs  10# fwd 5x  5# lat 3x all w/ belt 10# fwd 5x; 7# lat 5x ea b/l belt 10# fwd 10x; 7# lat 5x ea b/l belt          TB resisted hip strengthening    No TB 10x ea b/l           Lat step ups  6" 10x 6" x10 6"x10          DLS: clamshells              Hip hinge- seated   Standing 10x + deadlift against wall  Standing 10x + deadlift against wall          Ther Activity         STS   Low mat 10x Low mat 10x          Step ups  6" 10x 6" 10x 6" x 10          Gait Training                                     Modalities         CP PRN              TENS

## 2023-10-13 ENCOUNTER — APPOINTMENT (OUTPATIENT)
Dept: PHYSICAL THERAPY | Facility: CLINIC | Age: 68
End: 2023-10-13
Payer: COMMERCIAL

## 2023-10-16 ENCOUNTER — OFFICE VISIT (OUTPATIENT)
Dept: PHYSICAL THERAPY | Facility: CLINIC | Age: 68
End: 2023-10-16
Payer: COMMERCIAL

## 2023-10-16 DIAGNOSIS — M47.816 SPONDYLOSIS WITHOUT MYELOPATHY OR RADICULOPATHY, LUMBAR REGION: ICD-10-CM

## 2023-10-16 DIAGNOSIS — M48.062 PSEUDOCLAUDICATION SYNDROME: Primary | ICD-10-CM

## 2023-10-16 PROCEDURE — 97110 THERAPEUTIC EXERCISES: CPT | Performed by: PHYSICAL THERAPIST

## 2023-10-16 PROCEDURE — 97112 NEUROMUSCULAR REEDUCATION: CPT | Performed by: PHYSICAL THERAPIST

## 2023-10-16 NOTE — PROGRESS NOTES
Daily Note     Today's date: 10/16/2023  Patient name: Tarik Miller   : 1955  MRN: [id number]  Referring provider: Henry Georges MD  Dx:   Encounter Diagnosis     ICD-10-CM    1. 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 He reports that he walked in the woods approx 30 yards which was most likely too much for him. However, he does feel that the  resisted bhavin walking ex helped him prep for walking in the woods. Pt scheduled for 2nd ablation this Friday. Objective: See treatment diary below      Assessment: Tx session tolerated well as below. Progress as able. Plan: Continue per plan of care. Daily Treatment Diary    EPOC: 10/25/23  Precautions: HTN- takes meds; Hx of R partial TKA  Co- Morbidities: HTN- takes meds;  Hx of R partial TKA    Manuals 10/4 10/6 10/9 10/11 10/16         Consider TPR to lumbosacral area Held today Held today            Manual l/s traction w/ belt              SI MET f/b [employer] tech              Total Time              There Ex          Pt ed [facility]             Progress Note              Recumbent bike              Active HS stretch w/ ankle pumps              Hip flexor stretch              LTR 10" 10x ea 10" 10x ea 10"x10 ea 10"x10 10"x10         Hip ER stretch 30"x3 ea b/l 30"x3 ea b/l 30"x3 ea b/l 30"x3 30"x3         Piriformis Stretch 30"x3 ea b/l 30"x3 ea b/l 30"x3 ea b/l 30"x3 30"x3         Pball lumbar flexion stretch 10"x10 ea 10"x10 ea 10"x10 ea 10"x10 10"x10 ea          Open books to tolerance              Neuro-Re-ed         DLS: isometric abdominals- TA              DLS: isometric hip abduction w/ TA contraction              PNE              DLS: isometric hip adduction w/ TA contraction              DLS: fall outs w/ TA contraction DLS: marches w/ TA contraction 10x             DLS: S/l hip abduction              TB resisted sidestepping              Shalonda walk outs  10# fwd 5x  5# lat 3x all w/ belt 10# fwd 5x; 7# lat 5x ea b/l belt 10# fwd 10x; 7# lat 5x ea b/l belt 10# fwd 10x, 7# lat 5x ea b/l belt         TB resisted hip strengthening    No TB 10x ea b/l  No TB 15x ea b/l          Lat step ups  6" 10x 6" x10 6"x10 6" x15         DLS: clamshells              Hip hinge- seated   Standing 10x + deadlift against wall  Standing 10x + deadlift against wall          Ther Activity         STS   Low mat 10x Low mat 10x Low mat 10x         Step ups  6" 10x 6" 10x 6" x 10 6" x 10         Gait Training                                     Modalities         CP PRN              TENS

## 2023-10-18 ENCOUNTER — OFFICE VISIT (OUTPATIENT)
Dept: PHYSICAL THERAPY | Facility: CLINIC | Age: 68
End: 2023-10-18
Payer: COMMERCIAL

## 2023-10-18 DIAGNOSIS — M47.816 SPONDYLOSIS WITHOUT MYELOPATHY OR RADICULOPATHY, LUMBAR REGION: ICD-10-CM

## 2023-10-18 DIAGNOSIS — M48.062 PSEUDOCLAUDICATION SYNDROME: Primary | ICD-10-CM

## 2023-10-18 PROCEDURE — 97112 NEUROMUSCULAR REEDUCATION: CPT | Performed by: PHYSICAL THERAPIST

## 2023-10-18 PROCEDURE — 97110 THERAPEUTIC EXERCISES: CPT | Performed by: PHYSICAL THERAPIST

## 2023-10-18 NOTE — PROGRESS NOTES
Daily Note     Today's date: 10/18/2023  Patient name: Tarik Miller   : 1955  MRN: [id number]  Referring provider: Henry Georges MD  Dx:   Encounter Diagnosis     ICD-10-CM    1. 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 Spondylosis without myelopathy or radiculopathy, lumbar region  M47.816                      Subjective: Pt reports being sore after LV, but no major c/o pain. Objective: See treatment diary below      Assessment: Modified tx session today due to pt more sore upon arrival to tx session and did not want to aggravate prior to 1.5 hour drive down to ablation procedure on 10/20. Plan: Continue per plan of care. Daily Treatment Diary    EPOC: 10/25/23  Precautions: HTN- takes meds; Hx of R partial TKA  Co- Morbidities: HTN- takes meds;  Hx of R partial TKA    Manuals 10/4 10/6 10/9 10/11 10/16 10/18       Consider TPR to lumbosacral area Held today Held today           Manual l/s traction w/ belt             SI MET f/b shotgun tech             Total Time             There Ex          Pt ed [facility]            Progress Note             Recumbent bike             Active HS stretch w/ ankle pumps             Hip flexor stretch             LTR 10" 10x ea 10" 10x ea 10"x10 ea 10"x10 10"x10 10"x10       Hip ER stretch 30"x3 ea b/l 30"x3 ea b/l 30"x3 ea b/l 30"x3 30"x3 30"x3       Piriformis Stretch 30"x3 ea b/l 30"x3 ea b/l 30"x3 ea b/l 30"x3 30"x3 30"x3       Pball lumbar flexion stretch 10"x10 ea 10"x10 ea 10"x10 ea 10"x10 10"x10 ea  10"x10 ea       Open books to tolerance             Neuro-Re-ed         DLS: isometric abdominals- TA             DLS: isometric hip abduction w/ TA contraction             PNE             DLS: isometric hip adduction w/ TA contraction             DLS: fall outs w/ TA contraction             DLS: marches w/ TA contraction 10x     15x ea b/l        DLS: S/l hip abduction             TB resisted sidestepping             [place] walk outs  10# fwd 5x  5# lat 3x all w/ belt 10# fwd 5x; 7# lat 5x ea b/l belt 10# fwd 10x; 7# lat 5x ea b/l belt 10# fwd 10x, 7# lat 5x ea b/l belt 10# fwd 10x, 7# lat 5x ea b/l belt       TB resisted hip strengthening    No TB 10x ea b/l  No TB 15x ea b/l  No TB 10X ea b/l        Lat step ups  6" 10x 6" x10 6"x10 6" x15        DLS: clamshells             Hip hinge- seated   Standing 10x + deadlift against wall  Standing 10x + deadlift against wall         Ther Activity         STS   Low mat 10x Low mat 10x Low mat 10x Low mat 10x       Step ups  6" 10x 6" 10x 6" x 10 6" x 10        Gait Training                                   Modalities         CP PRN             TENS

## 2023-10-20 ENCOUNTER — APPOINTMENT (OUTPATIENT)
Dept: PHYSICAL THERAPY | Facility: CLINIC | Age: 68
End: 2023-10-20
Payer: COMMERCIAL

## 2023-10-23 ENCOUNTER — APPOINTMENT (OUTPATIENT)
Dept: PHYSICAL THERAPY | Facility: CLINIC | Age: 68
End: 2023-10-23
Payer: COMMERCIAL

## 2023-10-25 ENCOUNTER — APPOINTMENT (OUTPATIENT)
Dept: PHYSICAL THERAPY | Facility: CLINIC | Age: 68
End: 2023-10-25
Payer: COMMERCIAL

## 2023-10-26 ENCOUNTER — HOSPITAL ENCOUNTER (EMERGENCY)
Facility: HOSPITAL | Age: 68
Discharge: HOME | End: 2023-10-26
Attending: EMERGENCY MEDICINE
Payer: COMMERCIAL

## 2023-10-26 ENCOUNTER — APPOINTMENT (EMERGENCY)
Dept: CT IMAGING | Facility: HOSPITAL | Age: 68
End: 2023-10-26
Payer: COMMERCIAL

## 2023-10-26 VITALS
HEART RATE: 100 BPM | SYSTOLIC BLOOD PRESSURE: 173 MMHG | DIASTOLIC BLOOD PRESSURE: 115 MMHG | RESPIRATION RATE: 17 BRPM | TEMPERATURE: 97.8 F | OXYGEN SATURATION: 96 %

## 2023-10-26 DIAGNOSIS — M54.50 ACUTE LOW BACK PAIN: Primary | ICD-10-CM

## 2023-10-26 PROCEDURE — 99283 EMERGENCY DEPT VISIT LOW MDM: CPT

## 2023-10-26 PROCEDURE — 99284 EMERGENCY DEPT VISIT MOD MDM: CPT | Performed by: PHYSICIAN ASSISTANT

## 2023-10-26 PROCEDURE — 72131 CT LUMBAR SPINE W/O DYE: CPT

## 2023-10-26 PROCEDURE — 96372 THER/PROPH/DIAG INJ SC/IM: CPT

## 2023-10-26 RX ORDER — DIAZEPAM 5 MG/1
5 TABLET ORAL ONCE
Status: COMPLETED | OUTPATIENT
Start: 2023-10-26 | End: 2023-10-26

## 2023-10-26 RX ORDER — KETOROLAC TROMETHAMINE 30 MG/ML
15 INJECTION, SOLUTION INTRAMUSCULAR; INTRAVENOUS ONCE
Status: COMPLETED | OUTPATIENT
Start: 2023-10-26 | End: 2023-10-26

## 2023-10-26 RX ADMIN — KETOROLAC TROMETHAMINE 15 MG: 30 INJECTION, SOLUTION INTRAMUSCULAR; INTRAVENOUS at 15:12

## 2023-10-26 RX ADMIN — DIAZEPAM 5 MG: 5 TABLET ORAL at 15:13

## 2023-10-26 NOTE — DISCHARGE INSTRUCTIONS
Return to the Emergency Department sooner if increased pain, numbness, weakness, fever, vomiting, diarrhea, incontinence, difficulty walking or breathing or urinating, rash.

## 2023-10-27 ENCOUNTER — APPOINTMENT (OUTPATIENT)
Dept: PHYSICAL THERAPY | Facility: CLINIC | Age: 68
End: 2023-10-27
Payer: COMMERCIAL
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
